# Patient Record
Sex: FEMALE | Race: WHITE | Employment: FULL TIME | URBAN - NONMETROPOLITAN AREA
[De-identification: names, ages, dates, MRNs, and addresses within clinical notes are randomized per-mention and may not be internally consistent; named-entity substitution may affect disease eponyms.]

---

## 2020-08-04 PROBLEM — F41.9 ANXIETY: Status: ACTIVE | Noted: 2020-08-04

## 2020-08-04 PROBLEM — E78.5 HYPERLIPIDEMIA: Status: ACTIVE | Noted: 2020-08-04

## 2020-08-04 PROBLEM — M25.579 ANKLE PAIN: Status: ACTIVE | Noted: 2020-08-04

## 2020-08-04 PROBLEM — J30.9 ALLERGIC RHINITIS: Status: ACTIVE | Noted: 2020-08-04

## 2020-08-06 ENCOUNTER — CLINICAL SUPPORT (OUTPATIENT)
Dept: FAMILY MEDICINE CLINIC | Age: 60
End: 2020-08-06
Payer: COMMERCIAL

## 2020-08-06 VITALS
SYSTOLIC BLOOD PRESSURE: 111 MMHG | TEMPERATURE: 98 F | BODY MASS INDEX: 27.85 KG/M2 | HEART RATE: 73 BPM | DIASTOLIC BLOOD PRESSURE: 73 MMHG | HEIGHT: 69 IN | WEIGHT: 188 LBS | OXYGEN SATURATION: 97 %

## 2020-08-06 DIAGNOSIS — J30.89 ALLERGIC RHINITIS DUE TO OTHER ALLERGIC TRIGGER, UNSPECIFIED SEASONALITY: Primary | ICD-10-CM

## 2020-08-06 DIAGNOSIS — Z51.6 DESENSITIZATION TO ALLERGENS: ICD-10-CM

## 2020-08-06 DIAGNOSIS — J30.2 SEASONAL ALLERGIC RHINITIS, UNSPECIFIED TRIGGER: ICD-10-CM

## 2020-08-06 PROCEDURE — 95117 IMMUNOTHERAPY INJECTIONS: CPT | Performed by: FAMILY MEDICINE

## 2020-08-07 NOTE — PROGRESS NOTES
Patient presented to office today for Allergy injection. Patient stated She did not have any adverse reaction to last injection on 08/06/20. Patient received 0.5ml of allergy IM to bilateral gluteal media (hip region). She tolerated procedure well. Patient was observed for 10 minutes, no adverse effects noted. She left ambulatory with no complaints of pain or distress noted. Lot Number : [id number]  Expires 09/6/20  Manufacture: serum mixed at Dr. Salome Chawla office  NDC:   Dosage:0.5mL  IM bilateral gluteal media      Imelda eYe LPN completed nurse visit, Naseem Verdin CMA is helping her document.

## 2020-09-03 ENCOUNTER — CLINICAL SUPPORT (OUTPATIENT)
Dept: FAMILY MEDICINE CLINIC | Age: 60
End: 2020-09-03

## 2020-09-03 VITALS
OXYGEN SATURATION: 97 % | HEART RATE: 75 BPM | DIASTOLIC BLOOD PRESSURE: 77 MMHG | SYSTOLIC BLOOD PRESSURE: 116 MMHG | HEIGHT: 69 IN | BODY MASS INDEX: 27.7 KG/M2 | TEMPERATURE: 97.2 F | WEIGHT: 187 LBS

## 2020-09-03 DIAGNOSIS — J30.1 ALLERGIC RHINITIS DUE TO POLLEN, UNSPECIFIED SEASONALITY: Primary | ICD-10-CM

## 2020-09-03 NOTE — PROGRESS NOTES
Patient presented to office today for allergy injection. Patient stated Chivo Shepard (caps):00762}  /DID OAE:20356} have any adverse reaction to last injection on 8/6/20. Patient received ml of allergy IM to left subcutaneous deltoid. She tolerated procedure well. Patient was observed for 10 minutes, no adverse effects noted. She left ambulatory with no complaints of pain or distress noted.        RX/Lot Number [id number]  Expires 9/6/20  Mixed Product Dori  Dosage:0.5  right Subcutaneous deltoid      RX/Lot Number [id number]  Expires 9/6/2020   Mixed Product by: Dr Blaine Pérez  Dosage: 0.5   Left Subcutaneous deltoid    Verbal Order from Dr. Albertina Sanford on 9/3/2020

## 2020-10-29 ENCOUNTER — CLINICAL SUPPORT (OUTPATIENT)
Dept: FAMILY MEDICINE CLINIC | Age: 60
End: 2020-10-29
Payer: COMMERCIAL

## 2020-10-29 VITALS
TEMPERATURE: 98.1 F | DIASTOLIC BLOOD PRESSURE: 84 MMHG | SYSTOLIC BLOOD PRESSURE: 117 MMHG | HEART RATE: 72 BPM | OXYGEN SATURATION: 98 %

## 2020-10-29 DIAGNOSIS — Z51.6 DESENSITIZATION TO ALLERGENS: ICD-10-CM

## 2020-10-29 DIAGNOSIS — J30.1 ALLERGIC RHINITIS DUE TO POLLEN, UNSPECIFIED SEASONALITY: Primary | ICD-10-CM

## 2020-10-29 DIAGNOSIS — J30.9 ALLERGIC RHINITIS, UNSPECIFIED SEASONALITY, UNSPECIFIED TRIGGER: ICD-10-CM

## 2020-10-29 PROCEDURE — 95117 IMMUNOTHERAPY INJECTIONS: CPT | Performed by: FAMILY MEDICINE

## 2020-10-29 NOTE — PROGRESS NOTES
Allergy Clinic Documentation        If Ole Hamilton had a reaction on the last injection, is pregnant, is on beta blockers or has an  vial, DO NOT GIVE THIS INJECTION. Call Marco Landaverde MD at [phone]    Last injection reaction:     LMP: No LMP recorded. Data Unavailable   Is patient on Beta Blockers? no  Has the vial ? no    If Ole Hamilton has a fever, feels ill or is having asthma symptoms, DO NOT GIVE THE INJECTION. Vitals:   Visit Vitals  /84 (BP 1 Location: Right arm, BP Patient Position: Sitting)   Pulse 72   Temp 98.1 °F (36.7 °C)   SpO2 98%      Asthma symptoms? no  Feels ill? no    As per orders of [facility] NURSE 1, an injection of allergy serum was given. She was asked to report any reaction prior to leaving the clinic. Dav Harrell LPN     Previous Allergy Injection  No flowsheet data found.    Allergy Flowsheet

## 2020-10-29 NOTE — PROGRESS NOTES
Patient presented to office today for allergy injection. Patient stated She  did not have any adverse reaction to last injection. Patient received 0.3ml of allergy IM to bilateral  In her hips. She tolerated procedure well. Patient was observed for 10 minutes, no adverse effects noted. She left ambulatory with no complaints of pain or distress noted. Patient received injections per verbal order from Dr. Jannette Aparicio.

## 2020-11-05 ENCOUNTER — CLINICAL SUPPORT (OUTPATIENT)
Dept: FAMILY MEDICINE CLINIC | Age: 60
End: 2020-11-05
Payer: COMMERCIAL

## 2020-11-05 VITALS
HEART RATE: 74 BPM | HEIGHT: 69 IN | DIASTOLIC BLOOD PRESSURE: 77 MMHG | SYSTOLIC BLOOD PRESSURE: 116 MMHG | WEIGHT: 190 LBS | BODY MASS INDEX: 28.14 KG/M2

## 2020-11-05 DIAGNOSIS — J30.9 ALLERGIC RHINITIS, UNSPECIFIED SEASONALITY, UNSPECIFIED TRIGGER: ICD-10-CM

## 2020-11-05 DIAGNOSIS — Z51.6 DESENSITIZATION TO ALLERGENS: ICD-10-CM

## 2020-11-05 PROCEDURE — 95117 IMMUNOTHERAPY INJECTIONS: CPT | Performed by: FAMILY MEDICINE

## 2020-11-05 NOTE — PROGRESS NOTES
Allergy Clinic Documentation        If Jumana Lennon had a reaction on the last injection, is pregnant, is on beta blockers or has an  vial, DO NOT GIVE THIS INJECTION. Call Diamond Cruz MD at [phone]    Last injection reaction:     LMP: No LMP recorded. Data Unavailable   Is patient on Beta Blockers? no  Has the vial ? no    If Jumana Lennon has a fever, feels ill or is having asthma symptoms, DO NOT GIVE THE INJECTION. Vitals:   Visit Vitals  /77 (BP 1 Location: Right arm, BP Patient Position: Sitting)   Pulse 74   Ht 5' 9\" (1.753 m)   Wt 190 lb (86.2 kg)   BMI 28.06 kg/m²      Asthma symptoms? no  Feels ill? no    As per orders of [facility] NURSE 1, an injection of allergy serum was given bilateral in the hips. She was asked to report any reaction prior to leaving the clinic. Breonna Nash LPN     Previous Allergy Injection  No flowsheet data found.    Allergy Flowsheet

## 2020-11-12 ENCOUNTER — CLINICAL SUPPORT (OUTPATIENT)
Dept: FAMILY MEDICINE CLINIC | Age: 60
End: 2020-11-12
Payer: COMMERCIAL

## 2020-11-12 VITALS — DIASTOLIC BLOOD PRESSURE: 82 MMHG | TEMPERATURE: 97.5 F | HEART RATE: 83 BPM | SYSTOLIC BLOOD PRESSURE: 121 MMHG

## 2020-11-12 DIAGNOSIS — Z51.6 DESENSITIZATION TO ALLERGENS: ICD-10-CM

## 2020-11-12 DIAGNOSIS — J30.9 ALLERGIC RHINITIS, UNSPECIFIED SEASONALITY, UNSPECIFIED TRIGGER: ICD-10-CM

## 2020-11-12 PROCEDURE — 95117 IMMUNOTHERAPY INJECTIONS: CPT | Performed by: FAMILY MEDICINE

## 2020-11-12 NOTE — PROGRESS NOTES
Allergy Clinic Documentation        If Rah Rajput had a reaction on the last injection, is pregnant, is on beta blockers or has an  vial, DO NOT GIVE THIS INJECTION. Call Sabine Garrido MD at [phone]    Last injection reaction:     LMP: No LMP recorded. Data Unavailable   Is patient on Beta Blockers? no  Has the vial ? no    If Rah Rajput has a fever, feels ill or is having asthma symptoms, DO NOT GIVE THE INJECTION. Vitals:   Visit Vitals  /82 (BP 1 Location: Left arm, BP Patient Position: Sitting)   Pulse 83   Temp 97.5 °F (36.4 °C)      Asthma symptoms? no  Feels ill? no    As per orders of [facility] NURSE 1, an injection of allergy serum was given in each hip . She was asked to report any reaction prior to leaving the clinic. Eren Wade LPN     Previous Allergy Injection  No flowsheet data found.    Allergy Flowsheet

## 2020-12-22 ENCOUNTER — CLINICAL SUPPORT (OUTPATIENT)
Dept: FAMILY MEDICINE CLINIC | Age: 60
End: 2020-12-22

## 2020-12-22 VITALS — TEMPERATURE: 97.8 F | HEART RATE: 93 BPM | SYSTOLIC BLOOD PRESSURE: 120 MMHG | DIASTOLIC BLOOD PRESSURE: 77 MMHG

## 2020-12-22 DIAGNOSIS — J30.9 ALLERGIC RHINITIS, UNSPECIFIED SEASONALITY, UNSPECIFIED TRIGGER: ICD-10-CM

## 2020-12-22 NOTE — PROGRESS NOTES
Allergy Clinic Documentation        If Dada Buchanan had a reaction on the last injection, is pregnant, is on beta blockers or has an  vial, DO NOT GIVE THIS INJECTION. Call Nick Gomes MD at [phone]    Last injection reaction:     LMP: No LMP recorded. Data Unavailable   Is patient on Beta Blockers? no  Has the vial ? no    If Dada Buchanan has a fever, feels ill or is having asthma symptoms, DO NOT GIVE THE INJECTION. Vitals:   Visit Vitals  /77 (BP 1 Location: Left arm, BP Patient Position: Sitting)   Pulse 93   Temp 97.8 °F (36.6 °C)      Asthma symptoms? no  Feels ill? no    As per orders of [facility] NURSE 1, an injection of allergy serum was given bilateral in hips. She was asked to report any reaction prior to leaving the clinic. Gemini Sanchez LPN     Previous Allergy Injection  No flowsheet data found.    Allergy Flowsheet

## 2021-01-12 ENCOUNTER — CLINICAL SUPPORT (OUTPATIENT)
Dept: FAMILY MEDICINE CLINIC | Age: 61
End: 2021-01-12
Payer: COMMERCIAL

## 2021-01-12 DIAGNOSIS — Z51.6 DESENSITIZATION TO ALLERGENS: Primary | ICD-10-CM

## 2021-01-12 PROCEDURE — 95117 IMMUNOTHERAPY INJECTIONS: CPT | Performed by: FAMILY MEDICINE

## 2021-02-09 ENCOUNTER — VIRTUAL VISIT (OUTPATIENT)
Dept: FAMILY MEDICINE CLINIC | Age: 61
End: 2021-02-09
Payer: COMMERCIAL

## 2021-02-09 DIAGNOSIS — Z13.21 ENCOUNTER FOR VITAMIN DEFICIENCY SCREENING: ICD-10-CM

## 2021-02-09 DIAGNOSIS — Z13.29 SCREENING FOR THYROID DISORDER: ICD-10-CM

## 2021-02-09 DIAGNOSIS — Z11.59 NEED FOR HEPATITIS C SCREENING TEST: ICD-10-CM

## 2021-02-09 DIAGNOSIS — E78.2 MIXED HYPERLIPIDEMIA: ICD-10-CM

## 2021-02-09 DIAGNOSIS — J30.1 ALLERGIC RHINITIS DUE TO POLLEN, UNSPECIFIED SEASONALITY: ICD-10-CM

## 2021-02-09 DIAGNOSIS — E78.2 MIXED HYPERLIPIDEMIA: Primary | ICD-10-CM

## 2021-02-09 PROCEDURE — 99443 PR PHYS/QHP TELEPHONE EVALUATION 21-30 MIN: CPT | Performed by: NURSE PRACTITIONER

## 2021-02-09 RX ORDER — MONTELUKAST SODIUM 10 MG/1
10 TABLET ORAL DAILY
COMMUNITY

## 2021-02-09 RX ORDER — FLUTICASONE PROPIONATE 50 MCG
1 SPRAY, SUSPENSION (ML) NASAL
COMMUNITY

## 2021-02-09 RX ORDER — ATORVASTATIN CALCIUM 20 MG/1
20 TABLET, FILM COATED ORAL
COMMUNITY
End: 2021-03-24

## 2021-02-09 RX ORDER — PAROXETINE 7.5 MG/1
1 CAPSULE ORAL DAILY
COMMUNITY

## 2021-02-09 RX ORDER — LORATADINE 10 MG/1
10 TABLET ORAL
COMMUNITY

## 2021-02-09 NOTE — PROGRESS NOTES
Pursuant to the emergency declaration under the [facility] waiver authority and the [employer] Act, this phone visit was conducted, with patient's consent, to reduce the patient's risk of exposure to COVID-19 and provide continuity of care for an established patient. She and/or health care decision maker is aware that that she may receive a bill for this telephone service, depending on her insurance coverage, and has provided verbal consent to proceed. This is a Patient Initiated Episode with an Established Patient who has not had a related appointment within my department in the past 7 days or scheduled within the next 24 hours. HISTORY OF PRESENTING ILLNESS      James Hernandez is a 62 y.o. female evaluated via telephone on 2/9/2021 due to COVID 19 restrictions. Patient unable to participate in Virtual Visit with synchronous audio/visual technology. Patient presents to establish care. Patient has medical history significant for allergic rhinitis well-controlled on current medication regimen mixed hyperlipidemia tolerating atorvastatin without difficulty as well as post menopausal flushing and hot flashes which she states is well controlled on low-dose Paxil. Patient verbalizes no complaints of today. PCP Provider  Jose G Castañeda NP  Past Medical History:   Diagnosis Date    Allergic rhinitis 8/4/2020    Ankle pain 8/4/2020    Anxiety 8/4/2020    Asthma     Hyperlipidemia 8/4/2020      Past Surgical History:   Procedure Laterality Date    HX ORTHOPAEDIC      reconstructive jaw    HX TONSILLECTOMY       Allergies   Allergen Reactions    Latex Rash    Penicillins Anaphylaxis      History reviewed. No pertinent family history. Current Outpatient Medications   Medication Sig    atorvastatin (LIPITOR) 20 mg tablet Take 20 mg by mouth nightly.     PARoxetine mesylate,menop.sym, (Brisdelle) 7.5 mg cap Take 1 Tab by mouth daily.  montelukast (SINGULAIR) 10 mg tablet Take 10 mg by mouth daily.  loratadine (CLARITIN) 10 mg tablet Take 10 mg by mouth.  fluticasone propionate (Flonase Allergy Relief) 50 mcg/actuation nasal spray 1 [place] by Both Nostrils route nightly.  allergy injection Injected 0.5 ml subcutaneously in left arm    allergy injection Injected 0.5 ml right arm subcutaneously     No current facility-administered medications for this visit. There were no vitals filed for this visit.   Social History     Socioeconomic History    Marital status:      Spouse name: Not on file    Number of children: Not on file    Years of education: Not on file    Highest education level: Not on file   Occupational History    Not on file   Social Needs    Financial resource strain: Not on file    Food insecurity     Worry: Not on file     Inability: Not on file    Transportation needs     Medical: Not on file     Non-medical: Not on file   Tobacco Use    Smoking status: Never Smoker    Smokeless tobacco: Never Used    Tobacco comment: raised by smoker and was around smokers in the [employer], exposed to second had smoke until 23years old   Substance and Sexual Activity    Alcohol use: Not Currently    Drug use: Never    Sexual activity: Not on file   Lifestyle    Physical activity     Days per week: Not on file     Minutes per session: Not on file    Stress: Not on file   Relationships    Social connections     Talks on phone: Not on file     Gets together: Not on file     Attends Christian service: Not on file     Active member of club or organization: Not on file     Attends meetings of clubs or organizations: Not on file     Relationship status: Not on file    Intimate partner violence     Fear of current or ex partner: Not on file     Emotionally abused: Not on file     Physically abused: Not on file     Forced sexual activity: Not on file   Other Topics Concern    Not on file   Social History Narrative    Not on file       MEDICATIONS     Current Outpatient Medications   Medication Sig    atorvastatin (LIPITOR) 20 mg tablet Take 20 mg by mouth nightly.  PARoxetine mesylate,menop.sym, (Brisdelle) 7.5 mg cap Take 1 Tab by mouth daily.  montelukast (SINGULAIR) 10 mg tablet Take 10 mg by mouth daily.  loratadine (CLARITIN) 10 mg tablet Take 10 mg by mouth.  fluticasone propionate (Flonase Allergy Relief) 50 mcg/actuation nasal spray 1 [place] by Both Nostrils route nightly.  allergy injection Injected 0.5 ml subcutaneously in left arm    allergy injection Injected 0.5 ml right arm subcutaneously     No current facility-administered medications for this visit. I have reviewed the nurses notes, vitals, problem list, allergy list, medical history, family, social history and medications. REVIEW OF SYMPTOMS     General: Pt denies excessive weight gain or loss. Pt is able to conduct ADL's  HEENT: Denies blurred vision, headaches, hearing loss, epistaxis and difficulty swallowing. Respiratory: Denies cough, congestion, shortness of breath, BRANCH, wheezing or stridor. Cardiovascular: Denies precordial pain, palpitations, edema or PND  Gastrointestinal: Denies poor appetite, indigestion, abdominal pain or blood in stool  Genitourinary: Denies hematuria, dysuria, increased urinary frequency  Musculoskeletal: Denies joint pain or swelling from muscles or joints  Neurologic: Denies tremor, paresthesias, headache, or sensory motor disturbance  Psychiatric: Denies confusion, insomnia, depression  Integumentray: Denies rash, itching or ulcers.   Hematologic: Denies easy bruising, bleeding       PHYSICAL EXAM       Due to this being a telephone encounter a very limited exam was performed  Neurological: A&Ox3, no slurred speech, answering questions appropriately  Respiratory: Non labored, talking in complete sentences, no audible wheeze over the phone        ASSESSMENT        Diagnoses and all orders for this visit:    1. Mixed hyperlipidemia  -     CBC W/O DIFF; Future  -     LIPID PANEL; Future  -     METABOLIC PANEL, COMPREHENSIVE; Future  -Labs today any changes to current treatment contingent upon those findings    2. Allergic rhinitis due to pollen, unspecified seasonality  The current medical regimen is effective;  continue present plan and medications. 3. Encounter for vitamin deficiency screening  -     CBC W/O DIFF; Future  -     VITAMIN B12; Future  -     VITAMIN D, 25 HYDROXY; Future  -Labs today any changes to current treatment contingent upon those findings    4. Need for hepatitis C screening test  -     HEPATITIS C AB; Future  -Labs today any changes to current treatment contingent upon those findings    5. Screening for thyroid disorder  -     TSH 3RD GENERATION; Future  -Labs today any changes to current treatment contingent upon those findings      ICD-10-CM ICD-9-CM    1. Mixed hyperlipidemia  E78.2 272.2 CBC W/O DIFF      LIPID PANEL      METABOLIC PANEL, COMPREHENSIVE   2. Allergic rhinitis due to pollen, unspecified seasonality  J30.1 477.0    3. Encounter for vitamin deficiency screening  Z13.21 V77.99 CBC W/O DIFF      VITAMIN B12      VITAMIN D, 25 HYDROXY   4. Need for hepatitis C screening test  Z11.59 V73.89 HEPATITIS C AB   5.  Screening for thyroid disorder  Z13.29 V77.0 TSH 3RD GENERATION     Orders Placed This Encounter    CBC W/O DIFF     Standing Status:   Future     Standing Expiration Date:   2/10/2022    LIPID PANEL     Standing Status:   Future     Standing Expiration Date:   3/0/2673    METABOLIC PANEL, COMPREHENSIVE     Standing Status:   Future     Standing Expiration Date:   2/10/2022    VITAMIN B12     Standing Status:   Future     Standing Expiration Date:   2/10/2022    VITAMIN D, 25 HYDROXY     Standing Status:   Future     Standing Expiration Date:   2/10/2022    TSH 3RD GENERATION Standing Status:   Future     Standing Expiration Date:   2/10/2022    HEPATITIS C AB     Standing Status:   Future     Standing Expiration Date:   2/10/2022    atorvastatin (LIPITOR) 20 mg tablet     Sig: Take 20 mg by mouth nightly.  PARoxetine mesylate,menop.sym, (Brisdelle) 7.5 mg cap     Sig: Take 1 Tab by mouth daily.  montelukast (SINGULAIR) 10 mg tablet     Sig: Take 10 mg by mouth daily.  loratadine (CLARITIN) 10 mg tablet     Sig: Take 10 mg by mouth.  fluticasone propionate (Flonase Allergy Relief) 50 mcg/actuation nasal spray     [place] by Both Nostrils route nightly. PLAN       TIME 23 (Minutes) SPENT RELATED TO THIS PHONE ENCOUNTER    We discussed the expected course, resolution and complications of the diagnosis(es) in detail. Medication risks, benefits, costs, interactions, and alternatives were discussed as indicated. I advised her to contact the office if her condition worsens, changes or fails to improve as anticipated.  She expressed understanding with the diagnosis(es) and plan

## 2021-02-11 LAB
LDL-C, EXTERNAL: 104
TOTAL CHOLESTEROL, NCHOLT: 195

## 2021-03-24 DIAGNOSIS — E78.49 OTHER HYPERLIPIDEMIA: Primary | ICD-10-CM

## 2021-03-24 RX ORDER — ATORVASTATIN CALCIUM 20 MG/1
TABLET, FILM COATED ORAL
Qty: 90 TAB | Refills: 0 | Status: SHIPPED | OUTPATIENT
Start: 2021-03-24 | End: 2021-06-20

## 2021-06-20 DIAGNOSIS — E78.49 OTHER HYPERLIPIDEMIA: ICD-10-CM

## 2021-06-20 RX ORDER — ATORVASTATIN CALCIUM 20 MG/1
TABLET, FILM COATED ORAL
Qty: 90 TABLET | Refills: 0 | Status: SHIPPED | OUTPATIENT
Start: 2021-06-20 | End: 2021-09-08

## 2021-08-31 ENCOUNTER — OFFICE VISIT (OUTPATIENT)
Dept: FAMILY MEDICINE CLINIC | Age: 61
End: 2021-08-31
Payer: COMMERCIAL

## 2021-08-31 VITALS
RESPIRATION RATE: 19 BRPM | TEMPERATURE: 97.4 F | HEART RATE: 72 BPM | HEIGHT: 69 IN | OXYGEN SATURATION: 98 % | SYSTOLIC BLOOD PRESSURE: 116 MMHG | DIASTOLIC BLOOD PRESSURE: 70 MMHG | BODY MASS INDEX: 26.81 KG/M2 | WEIGHT: 181 LBS

## 2021-08-31 DIAGNOSIS — Z00.00 WELLNESS EXAMINATION: ICD-10-CM

## 2021-08-31 DIAGNOSIS — J30.2 SEASONAL ALLERGIC RHINITIS, UNSPECIFIED TRIGGER: ICD-10-CM

## 2021-08-31 DIAGNOSIS — E78.2 MIXED HYPERLIPIDEMIA: Primary | ICD-10-CM

## 2021-08-31 PROCEDURE — 99396 PREV VISIT EST AGE 40-64: CPT | Performed by: NURSE PRACTITIONER

## 2021-08-31 NOTE — PROGRESS NOTES
Yuridia Ferris presents today for   Chief Complaint   Patient presents with    Follow-up       Is someone accompanying this pt? No    Is the patient using any DME equipment during OV? No    Depression Screening:  3 most recent PHQ Screens 8/31/2021   Little interest or pleasure in doing things Not at all   Feeling down, depressed, irritable, or hopeless Not at all   Total Score PHQ 2 0       Learning Assessment:  Learning Assessment 2/9/2021   PRIMARY LEARNER Patient   HIGHEST LEVEL OF EDUCATION - PRIMARY LEARNER  > 4 YEARS OF COLLEGE   BARRIERS PRIMARY LEARNER NONE   CO-LEARNER CAREGIVER No   PRIMARY LANGUAGE ENGLISH   LEARNER PREFERENCE PRIMARY LISTENING   ANSWERED BY patient   RELATIONSHIP SELF       Health Maintenance reviewed and discussed and ordered per Provider. Health Maintenance Due   Topic Date Due    Shingrix Vaccine Age 49> (1 of 2) Never done    Breast Cancer Screen Mammogram  11/22/2019   . Coordination of Care:  1. Have you been to the ER, urgent care clinic since your last visit? Hospitalized since your last visit? No    2. Have you seen or consulted any other health care providers outside of the [address] [place] [address] since your last visit? Include any pap smears or colon screening.  no

## 2021-09-06 NOTE — PROGRESS NOTES
Ramya Canas is a 62 y. o.female presents with   Chief Complaint   Patient presents with    Follow-up       71-year-old female presents today in office for annual adult preventive exam.  Patient has medical history significant for mixed hyperlipidemia tolerating atorvastatin without difficulty as well as seasonal allergies currently on allergy shots. She verbalizes no complaints of today. Subjective:           Past Medical History:   Diagnosis Date    Allergic rhinitis 8/4/2020    Ankle pain 8/4/2020    Anxiety 8/4/2020    Asthma     Hyperlipidemia 8/4/2020     Past Surgical History:   Procedure Laterality Date    HX ORTHOPAEDIC      reconstructive jaw    HX TONSILLECTOMY       Social History     Socioeconomic History    Marital status:      Spouse name: Not on file    Number of children: Not on file    Years of education: Not on file    Highest education level: Not on file   Tobacco Use    Smoking status: Never Smoker    Smokeless tobacco: Never Used    Tobacco comment: raised by smoker and was around smokers in the [employer], exposed to second had smoke until 23years old   Vaping Use    Vaping Use: Never used   Substance and Sexual Activity    Alcohol use: Not Currently    Drug use: Never     Social Determinants of Health     Financial Resource Strain:     Difficulty of Paying Living Expenses:    Food Insecurity:     Worried About Running Out of Food in the Last Year:     [address] in the Last Year:    Transportation Needs:     Lack of Transportation (Medical):      Lack of Transportation (Non-Medical):    Physical Activity:     Days of Exercise per Week:     Minutes of Exercise per Session:    Stress:     Feeling of Stress :    Social Connections:     Frequency of Communication with Friends and Family:     Frequency of Social Gatherings with Friends and Family:     Attends Synagogue Services:     Active Member of Clubs or Organizations:     Attends Club or Organization Meetings:     Marital Status:      Current Outpatient Medications   Medication Sig Dispense Refill    atorvastatin (LIPITOR) 20 mg tablet Take 1 tablet by mouth once daily 90 Tablet 0    PARoxetine mesylate,menop.sym, (Brisdelle) 7.5 mg cap Take 1 Tab by mouth daily.  montelukast (SINGULAIR) 10 mg tablet Take 10 mg by mouth daily.  loratadine (CLARITIN) 10 mg tablet Take 10 mg by mouth.  fluticasone propionate (Flonase Allergy Relief) 50 mcg/actuation nasal spray 1 [place] by Both Nostrils route nightly.  allergy injection Injected 0.5 ml subcutaneously in left arm 0.5 mL 0    allergy injection Injected 0.5 ml right arm subcutaneously 0.5 mL 0     Allergies   Allergen Reactions    Latex Rash    Penicillins Anaphylaxis     The patient has a family history of    REVIEW OF SYSTEMS  Review of Systems   Constitutional: Negative for chills and fever. HENT: Positive for congestion. Negative for ear discharge, ear pain, hearing loss, sinus pain and sore throat. Eyes: Negative for pain. Respiratory: Negative for cough and shortness of breath. Cardiovascular: Negative for chest pain, palpitations and leg swelling. Gastrointestinal: Negative for abdominal pain, nausea and vomiting. Genitourinary: Negative for dysuria, frequency and urgency. Musculoskeletal: Negative for falls, myalgias and neck pain. Skin: Negative for rash. Neurological: Negative for dizziness, tingling, tremors and headaches. Psychiatric/Behavioral: Negative for depression, substance abuse and suicidal ideas. The patient is not nervous/anxious and does not have insomnia.         Objective:     Visit Vitals  /70 (BP 1 Location: Left upper arm, BP Patient Position: Sitting, BP Cuff Size: Adult)   Pulse 72   Temp 97.4 °F (36.3 °C) (Temporal)   Resp 19   Ht 5' 9\" (1.753 m)   Wt 181 lb (82.1 kg)   SpO2 98%   BMI 26.73 kg/m²       Current Outpatient Medications   Medication Instructions    allergy injection Injected 0.5 ml subcutaneously in left arm    allergy injection Injected 0.5 ml right arm subcutaneously    atorvastatin (LIPITOR) 20 mg tablet Take 1 tablet by mouth once daily    fluticasone propionate (Flonase Allergy Relief) 50 mcg/actuation nasal spray 1 Spray, Both Nostrils, EVERY BEDTIME    loratadine (CLARITIN) 10 mg, Oral    montelukast (SINGULAIR) 10 mg, Oral, DAILY    PARoxetine mesylate,menop.sym, (Brisdelle) 7.5 mg cap 1 Tablet, Oral, DAILY        PHYSICAL EXAM  Physical Exam  Constitutional:       General: She is not in acute distress. Appearance: Normal appearance. She is not ill-appearing. HENT:      Head: Normocephalic. Right Ear: External ear normal.      Left Ear: External ear normal.   Eyes:      General: No scleral icterus. Right eye: No discharge. Left eye: No discharge. Cardiovascular:      Rate and Rhythm: Normal rate and regular rhythm. Pulses: Normal pulses. Heart sounds: Normal heart sounds. Pulmonary:      Effort: Pulmonary effort is normal.      Breath sounds: Normal breath sounds. Musculoskeletal:      Cervical back: No muscular tenderness. Right lower leg: No edema. Left lower leg: No edema. Lymphadenopathy:      Cervical: No cervical adenopathy. Skin:     General: Skin is warm and dry. Neurological:      Mental Status: She is alert and oriented to person, place, and time. Psychiatric:         Mood and Affect: Mood normal.         Behavior: Behavior normal.         Thought Content: Thought content normal.         Judgment: Judgment normal.         Assessment/Plan:     Diagnoses and all orders for this visit:    1. Mixed hyperlipidemia    2. Wellness examination  -     CBC W/O DIFF  -     LIPID PANEL  -     METABOLIC PANEL, COMPREHENSIVE  -     VITAMIN B12  -     VITAMIN D, 25 HYDROXY  -     TSH 3RD GENERATION    3.  Seasonal allergic rhinitis, unspecified trigger    -Labs today any changes to current treatment contingent upon those findings      Follow-up and Dispositions    · Return in about 1 year (around 8/31/2022). Disclaimer:    I have discussed the diagnosis with the patient and the intended plan as seen above. The patient understands our medical plan. The risks, benefits and significant side effects of all medications have been reviewed. Anticipated time course and progression of condition reviewed. All questions have been addressed. She received an after visit summary, with information reviewed, and questions answered. Where appropriate, she is instructed to call the clinic if she has not been notified either by phone or through [id number] with the results of her tests or with an appointment plan for any referrals within 1 week(s). The patient  is to call if her condition worsens or fails to improve or if significant side effects are experienced.        Maria Eugenia Hunter, NP

## 2021-09-08 DIAGNOSIS — E78.49 OTHER HYPERLIPIDEMIA: ICD-10-CM

## 2021-09-08 RX ORDER — ATORVASTATIN CALCIUM 20 MG/1
TABLET, FILM COATED ORAL
Qty: 90 TABLET | Refills: 0 | Status: SHIPPED | OUTPATIENT
Start: 2021-09-08 | End: 2021-12-15 | Stop reason: SDUPTHER

## 2021-12-09 ENCOUNTER — IMMUNIZATION (OUTPATIENT)
Dept: FAMILY MEDICINE CLINIC | Age: 61
End: 2021-12-09

## 2021-12-15 DIAGNOSIS — E78.49 OTHER HYPERLIPIDEMIA: ICD-10-CM

## 2021-12-15 RX ORDER — ATORVASTATIN CALCIUM 20 MG/1
20 TABLET, FILM COATED ORAL DAILY
Qty: 90 TABLET | Refills: 1 | Status: SHIPPED | OUTPATIENT
Start: 2021-12-15 | End: 2022-06-21

## 2021-12-15 NOTE — TELEPHONE ENCOUNTER
Pt needs refill of Atorvastatin. [address]. She said that she has 3 weeks left, but wanted to get it situated before she's out. It still has Dr. Conchita Patton name on it and they wouldn't process it.

## 2022-03-18 PROBLEM — E78.5 HYPERLIPIDEMIA: Status: ACTIVE | Noted: 2020-08-04

## 2022-03-18 PROBLEM — J30.9 ALLERGIC RHINITIS: Status: ACTIVE | Noted: 2020-08-04

## 2022-03-18 PROBLEM — M25.579 ANKLE PAIN: Status: ACTIVE | Noted: 2020-08-04

## 2022-03-19 PROBLEM — F41.9 ANXIETY: Status: ACTIVE | Noted: 2020-08-04

## 2022-09-08 ENCOUNTER — OFFICE VISIT (OUTPATIENT)
Dept: FAMILY MEDICINE CLINIC | Age: 62
End: 2022-09-08
Payer: COMMERCIAL

## 2022-09-08 VITALS
HEART RATE: 72 BPM | WEIGHT: 189 LBS | TEMPERATURE: 97.7 F | RESPIRATION RATE: 19 BRPM | OXYGEN SATURATION: 98 % | SYSTOLIC BLOOD PRESSURE: 132 MMHG | HEIGHT: 69 IN | DIASTOLIC BLOOD PRESSURE: 86 MMHG | BODY MASS INDEX: 27.99 KG/M2

## 2022-09-08 DIAGNOSIS — E78.2 MIXED HYPERLIPIDEMIA: Primary | ICD-10-CM

## 2022-09-08 DIAGNOSIS — M25.40 SWOLLEN JOINT: ICD-10-CM

## 2022-09-08 DIAGNOSIS — Z00.00 WELLNESS EXAMINATION: ICD-10-CM

## 2022-09-08 DIAGNOSIS — M25.542 ARTHRALGIA OF BOTH HANDS: ICD-10-CM

## 2022-09-08 DIAGNOSIS — M25.541 ARTHRALGIA OF BOTH HANDS: ICD-10-CM

## 2022-09-08 PROCEDURE — 99213 OFFICE O/P EST LOW 20 MIN: CPT | Performed by: NURSE PRACTITIONER

## 2022-09-08 PROCEDURE — 99396 PREV VISIT EST AGE 40-64: CPT | Performed by: NURSE PRACTITIONER

## 2022-09-08 NOTE — PROGRESS NOTES
Kaela Breaux presents today for   Chief Complaint   Patient presents with    Physical       Is someone accompanying this pt? No    Is the patient using any DME equipment during OV? No    Depression Screening:  3 most recent PHQ Screens 9/8/2022   Little interest or pleasure in doing things Not at all   Feeling down, depressed, irritable, or hopeless Not at all   Total Score PHQ 2 0       Learning Assessment:  Learning Assessment 2/9/2021   PRIMARY LEARNER Patient   HIGHEST LEVEL OF EDUCATION - PRIMARY LEARNER  > 4 YEARS OF COLLEGE   BARRIERS PRIMARY LEARNER NONE   CO-LEARNER CAREGIVER No   PRIMARY LANGUAGE ENGLISH   LEARNER PREFERENCE PRIMARY LISTENING   ANSWERED BY patient   RELATIONSHIP SELF       Fall Risk  No flowsheet data found. Health Maintenance reviewed and discussed and ordered per Provider. Health Maintenance Due   Topic Date Due    Shingrix Vaccine Age 49> (1 of 2) Never done    Breast Cancer Screen Mammogram  11/22/2019    Lipid Screen  02/11/2022    Cervical cancer screen  07/11/2022    Flu Vaccine (1) 09/01/2022    Depression Screen  08/31/2022   . Coordination of Care:    1. Have you been to the ER, urgent care clinic since your last visit? Hospitalized since your last visit? No    2. Have you seen or consulted any other health care providers outside of the [address] [place] [address] since your last visit? Include any pap smears or colon screening. No    3. For patients aged 39-70: Has the patient had a colonoscopy / FIT/ Cologuard? Yes - no Care Gap present      If the patient is female:    4. For patients aged 41-77: Has the patient had a mammogram within the past 2 years? No - patient refused      5. For patients aged 21-65: Has the patient had a pap smear? Yes - Care Gap present.  Rooming MA/LPN to request most recent results - Dr. Emely Sanchez

## 2022-09-13 ENCOUNTER — HOSPITAL ENCOUNTER (OUTPATIENT)
Dept: LAB | Age: 62
Discharge: HOME | End: 2022-09-13
Payer: COMMERCIAL

## 2022-09-13 LAB
ALBUMIN SERPL-MCNC: 3.7 G/DL (ref 3.4–5)
ALBUMIN/GLOB SERPL: 1.1 {RATIO} (ref 0.8–1.7)
ALP SERPL-CCNC: 109 U/L (ref 45–117)
ALT SERPL-CCNC: 35 U/L (ref 13–56)
ANION GAP SERPL CALC-SCNC: 11 MMOL/L (ref 3–18)
AST SERPL W P-5'-P-CCNC: 18 U/L (ref 10–38)
BASOPHILS # BLD: 0.1 K/UL (ref 0–0.1)
BASOPHILS NFR BLD: 1 % (ref 0–2)
BILIRUB SERPL-MCNC: 0.9 MG/DL (ref 0.2–1)
BUN SERPL-MCNC: 19 MG/DL (ref 7–18)
BUN/CREAT SERPL: 24 (ref 12–20)
CA-I BLD-MCNC: 9.5 MG/DL (ref 8.5–10.1)
CHLORIDE SERPL-SCNC: 102 MMOL/L (ref 100–111)
CO2 SERPL-SCNC: 25 MMOL/L (ref 21–32)
CREAT SERPL-MCNC: 0.78 MG/DL (ref 0.6–1.3)
DIFFERENTIAL METHOD BLD: ABNORMAL
EOSINOPHIL # BLD: 0.1 K/UL (ref 0–0.4)
EOSINOPHIL NFR BLD: 1 % (ref 0–5)
ERYTHROCYTE [DISTWIDTH] IN BLOOD BY AUTOMATED COUNT: 13.2 % (ref 11.6–14.5)
GLOBULIN SER CALC-MCNC: 3.4 G/DL (ref 2–4)
GLUCOSE SERPL-MCNC: 120 MG/DL (ref 74–99)
HCT VFR BLD AUTO: 38.4 % (ref 35–45)
HGB BLD-MCNC: 12.3 G/DL (ref 12–16)
IMM GRANULOCYTES # BLD AUTO: 0 K/UL (ref 0–0.04)
IMM GRANULOCYTES NFR BLD AUTO: 0 % (ref 0–0.5)
LYMPHOCYTES # BLD: 2.5 K/UL (ref 0.9–3.6)
LYMPHOCYTES NFR BLD: 35 % (ref 21–52)
MCH RBC QN AUTO: 30.1 PG (ref 24–34)
MCHC RBC AUTO-ENTMCNC: 32 G/DL (ref 31–37)
MCV RBC AUTO: 94.1 FL (ref 78–100)
MONOCYTES # BLD: 0.6 K/UL (ref 0.05–1.2)
MONOCYTES NFR BLD: 8 % (ref 3–10)
NEUTS SEG # BLD: 4.1 K/UL (ref 1.8–8)
NEUTS SEG NFR BLD: 55 % (ref 40–73)
NRBC # BLD: 0 K/UL (ref 0–0.01)
NRBC BLD-RTO: 0 PER 100 WBC
PLATELET # BLD AUTO: 300 K/UL (ref 135–420)
PMV BLD AUTO: 9.4 FL (ref 9.2–11.8)
POTASSIUM SERPL-SCNC: 3.7 MMOL/L (ref 3.5–5.5)
PROT SERPL-MCNC: 7.1 G/DL (ref 6.4–8.2)
RBC # BLD AUTO: 4.08 M/UL (ref 4.2–5.3)
SODIUM SERPL-SCNC: 138 MMOL/L (ref 136–145)
TSH SERPL DL<=0.05 MIU/L-ACNC: 2.31 UIU/ML (ref 0.36–3.74)
WBC # BLD AUTO: 7.3 K/UL (ref 4.6–13.2)

## 2022-09-13 PROCEDURE — 82306 VITAMIN D 25 HYDROXY: CPT

## 2022-09-13 PROCEDURE — 36415 COLL VENOUS BLD VENIPUNCTURE: CPT

## 2022-09-13 PROCEDURE — 80053 COMPREHEN METABOLIC PANEL: CPT

## 2022-09-13 PROCEDURE — 86431 RHEUMATOID FACTOR QUANT: CPT

## 2022-09-13 PROCEDURE — 82607 VITAMIN B-12: CPT

## 2022-09-13 PROCEDURE — 86038 ANTINUCLEAR ANTIBODIES: CPT

## 2022-09-13 PROCEDURE — 80061 LIPID PANEL: CPT

## 2022-09-13 PROCEDURE — 85025 COMPLETE CBC W/AUTO DIFF WBC: CPT

## 2022-09-13 PROCEDURE — 84443 ASSAY THYROID STIM HORMONE: CPT

## 2022-09-14 LAB
25(OH)D3 SERPL-MCNC: 60.2 NG/ML (ref 30–100)
ANA SER QL: NEGATIVE
CHOLEST SERPL-MCNC: 155 MG/DL
HDLC SERPL-MCNC: 67 MG/DL (ref 40–60)
HDLC SERPL: 2.3 {RATIO} (ref 0–5)
LDLC SERPL CALC-MCNC: 72.4 MG/DL (ref 0–100)
LIPID PROFILE,FLP: ABNORMAL
RHEUMATOID FACT SERPL-ACNC: <10 IU/ML
TRIGL SERPL-MCNC: 78 MG/DL (ref ?–150)
VIT B12 SERPL-MCNC: >2000 PG/ML (ref 211–911)
VLDLC SERPL CALC-MCNC: 15.6 MG/DL

## 2022-11-29 ENCOUNTER — OFFICE VISIT (OUTPATIENT)
Dept: FAMILY MEDICINE CLINIC | Age: 62
End: 2022-11-29
Payer: COMMERCIAL

## 2022-11-29 VITALS
HEIGHT: 69 IN | RESPIRATION RATE: 20 BRPM | SYSTOLIC BLOOD PRESSURE: 126 MMHG | OXYGEN SATURATION: 98 % | HEART RATE: 93 BPM | BODY MASS INDEX: 27.37 KG/M2 | WEIGHT: 184.8 LBS | DIASTOLIC BLOOD PRESSURE: 80 MMHG | TEMPERATURE: 97 F

## 2022-11-29 DIAGNOSIS — Z82.49 FAMILY HISTORY OF MI (MYOCARDIAL INFARCTION): ICD-10-CM

## 2022-11-29 DIAGNOSIS — R42 DIZZINESS: ICD-10-CM

## 2022-11-29 DIAGNOSIS — Z13.21 ENCOUNTER FOR VITAMIN DEFICIENCY SCREENING: ICD-10-CM

## 2022-11-29 DIAGNOSIS — E78.2 MIXED HYPERLIPIDEMIA: Primary | ICD-10-CM

## 2022-11-29 DIAGNOSIS — R06.00 DYSPNEA, UNSPECIFIED TYPE: ICD-10-CM

## 2022-11-29 PROCEDURE — 99215 OFFICE O/P EST HI 40 MIN: CPT | Performed by: NURSE PRACTITIONER

## 2022-11-29 RX ORDER — LANOLIN ALCOHOL/MO/W.PET/CERES
CREAM (GRAM) TOPICAL
COMMUNITY

## 2022-11-29 NOTE — PROGRESS NOTES
Yusra Yepez is a 65 y. o.female presents with   Chief Complaint   Patient presents with    Dizziness    Hypertension    Shortness of Breath     52-year-old female presents today in office with complaint of increasing dyspnea on exertion and dizziness. Patient has history significant for mixed hyperlipidemia. She relates she does have strong cardiac family history to include MI as in the family. She denies any current chest pain palpitations. Subjective:           Past Medical History:   Diagnosis Date    Allergic rhinitis 8/4/2020    Ankle pain 8/4/2020    Anxiety 8/4/2020    Asthma     Hyperlipidemia 8/4/2020     Past Surgical History:   Procedure Laterality Date    HX ORTHOPAEDIC      reconstructive jaw    HX TONSILLECTOMY       Social History     Socioeconomic History    Marital status:    Tobacco Use    Smoking status: Never    Smokeless tobacco: Never    Tobacco comments:     raised by smoker and was around smokers in the [employer], exposed to second had smoke until 23years old   Vaping Use    Vaping Use: Never used   Substance and Sexual Activity    Alcohol use: Not Currently    Drug use: Never     Current Outpatient Medications   Medication Sig Dispense Refill    ferrous sulfate (Iron) 325 mg (65 mg iron) tablet Take  by mouth Daily (before breakfast).  atorvastatin (LIPITOR) 20 mg tablet Take 1 tablet by mouth once daily 30 Tablet 2    PARoxetine mesylate,menop.sym, 7.5 mg cap Take 1 Tab by mouth daily.  montelukast (SINGULAIR) 10 mg tablet Take 10 mg by mouth daily.  loratadine (CLARITIN) 10 mg tablet Take 10 mg by mouth.  fluticasone propionate (FLONASE) 50 mcg/actuation nasal spray 1 [place] by Both Nostrils route nightly.       allergy injection Injected 0.5 ml subcutaneously in left arm 0.5 mL 0    allergy injection Injected 0.5 ml right arm subcutaneously 0.5 mL 0     Allergies   Allergen Reactions    Latex Rash    Penicillins Anaphylaxis     The patient has a family history of    REVIEW OF SYSTEMS  Review of Systems   Respiratory:  Positive for shortness of breath. Negative for cough. Cardiovascular:  Negative for chest pain, palpitations and leg swelling. Neurological:  Positive for dizziness. Negative for headaches. Objective:     Visit Vitals  /80 (BP 1 Location: Right upper arm, BP Patient Position: Sitting, BP Cuff Size: Adult)   Pulse 93   Temp 97 °F (36.1 °C) (Temporal)   Resp 20   Ht 5' 9\" (1.753 m)   Wt 184 lb 12.8 oz (83.8 kg)   SpO2 98%   BMI 27.29 kg/m²       Current Outpatient Medications   Medication Instructions    allergy injection Injected 0.5 ml subcutaneously in left arm    allergy injection Injected 0.5 ml right arm subcutaneously    atorvastatin (LIPITOR) 20 mg tablet Take 1 tablet by mouth once daily    ferrous sulfate (Iron) 325 mg (65 mg iron) tablet Oral, DAILY BEFORE BREAKFAST    fluticasone propionate (FLONASE) 50 mcg/actuation nasal spray 1 Spray, Both Nostrils, EVERY BEDTIME    loratadine (CLARITIN) 10 mg, Oral    montelukast (SINGULAIR) 10 mg, Oral, DAILY    PARoxetine mesylate,menop.sym, 7.5 mg cap 1 Tablet, Oral, DAILY        PHYSICAL EXAM  Physical Exam  Constitutional:       Appearance: Normal appearance. Cardiovascular:      Heart sounds: Normal heart sounds. Pulmonary:      Breath sounds: Normal breath sounds. Musculoskeletal:      Right lower leg: No edema. Left lower leg: No edema. Neurological:      Mental Status: She is alert and oriented to person, place, and time. Psychiatric:         Behavior: Behavior normal.       Assessment/Plan:     Diagnoses and all orders for this visit:    1. Mixed hyperlipidemia  -     LIPID PANEL    2. Family history of MI (myocardial infarction)    3.  Dyspnea, unspecified type  -     METABOLIC PANEL, COMPREHENSIVE  -     MAGNESIUM  -     IRON PROFILE  -     FERRITIN  -     ECHO ADULT COMPLETE; Future  -     EXERCISE CARDIAC STRESS TEST; Future  I am working the patient up given her signs and symptoms and her concern in regards to her family's medical history further treatment or evaluation will be contingent upon the findings. If you experience chest pain call 911. Do not drive yourself. 4. Dizziness  -     CBC W/O DIFF  -     HEMOGLOBIN A1C W/O EAG  -     LIPID PANEL  -     METABOLIC PANEL, COMPREHENSIVE  -     MAGNESIUM  -     IRON PROFILE  -     FERRITIN  -     TSH 3RD GENERATION  -     ECHO ADULT COMPLETE; Future  -     EXERCISE CARDIAC STRESS TEST; Future    5. Encounter for vitamin deficiency screening  -     VITAMIN D, 25 HYDROXY  -     VITAMIN B12        Follow-up and Dispositions    Return if symptoms worsen or fail to improve. Disclaimer:    I have discussed the diagnosis with the patient and the intended plan as seen above. The patient understands our medical plan. The risks, benefits and significant side effects of all medications have been reviewed. Anticipated time course and progression of condition reviewed. All questions have been addressed. She received an after visit summary, with information reviewed, and questions answered. Where appropriate, she is instructed to call the clinic if she has not been notified either by phone or through [address] with the results of her tests or with an appointment plan for any referrals within 1 week(s). The patient  is to call if her condition worsens or fails to improve or if significant side effects are experienced.        Bossman Carrillo NP

## 2022-12-29 ENCOUNTER — HOSPITAL ENCOUNTER (OUTPATIENT)
Dept: NON INVASIVE DIAGNOSTICS | Age: 62
End: 2022-12-29
Attending: NURSE PRACTITIONER
Payer: COMMERCIAL

## 2022-12-29 ENCOUNTER — HOSPITAL ENCOUNTER (OUTPATIENT)
Dept: NON INVASIVE DIAGNOSTICS | Age: 62
Discharge: HOME | End: 2022-12-29
Attending: NURSE PRACTITIONER
Payer: COMMERCIAL

## 2022-12-29 VITALS
WEIGHT: 184 LBS | HEIGHT: 69 IN | SYSTOLIC BLOOD PRESSURE: 121 MMHG | BODY MASS INDEX: 27.25 KG/M2 | DIASTOLIC BLOOD PRESSURE: 83 MMHG

## 2022-12-29 DIAGNOSIS — R06.00 DYSPNEA, UNSPECIFIED TYPE: ICD-10-CM

## 2022-12-29 DIAGNOSIS — R42 DIZZINESS: ICD-10-CM

## 2022-12-29 LAB
ECHO AO ASC DIAM: 3.3 CM
ECHO AO ASCENDING AORTA INDEX: 1.66 CM/M2
ECHO AO ROOT DIAM: 3.3 CM
ECHO AO ROOT INDEX: 1.66 CM/M2
ECHO AV AREA PEAK VELOCITY: 3 CM2
ECHO AV AREA VTI: 3 CM2
ECHO AV AREA/BSA PEAK VELOCITY: 1.5 CM2/M2
ECHO AV AREA/BSA VTI: 1.5 CM2/M2
ECHO AV MEAN GRADIENT: 3 MMHG
ECHO AV MEAN VELOCITY: 0.8 M/S
ECHO AV PEAK GRADIENT: 5 MMHG
ECHO AV PEAK VELOCITY: 1.1 M/S
ECHO AV VELOCITY RATIO: 0.82
ECHO AV VTI: 27 CM
ECHO LA DIAMETER INDEX: 1.71 CM/M2
ECHO LA DIAMETER: 3.4 CM
ECHO LA TO AORTIC ROOT RATIO: 1.03
ECHO LA VOL 2C: 48 ML (ref 22–52)
ECHO LA VOL 4C: 49 ML (ref 22–52)
ECHO LA VOL BP: 48 ML (ref 22–52)
ECHO LA VOL/BSA BIPLANE: 24 ML/M2 (ref 16–34)
ECHO LA VOLUME AREA LENGTH: 53 ML
ECHO LA VOLUME INDEX A2C: 24 ML/M2 (ref 16–34)
ECHO LA VOLUME INDEX A4C: 25 ML/M2 (ref 16–34)
ECHO LA VOLUME INDEX AREA LENGTH: 27 ML/M2 (ref 16–34)
ECHO LV E' LATERAL VELOCITY: 9 CM/S
ECHO LV E' SEPTAL VELOCITY: 11 CM/S
ECHO LV EDV A2C: 58 ML
ECHO LV EDV A4C: 107 ML
ECHO LV EDV BP: 85 ML (ref 56–104)
ECHO LV EDV INDEX A4C: 54 ML/M2
ECHO LV EDV INDEX BP: 43 ML/M2
ECHO LV EDV NDEX A2C: 29 ML/M2
ECHO LV EJECTION FRACTION A2C: 74 %
ECHO LV EJECTION FRACTION A4C: 53 %
ECHO LV EJECTION FRACTION BIPLANE: 65 % (ref 55–100)
ECHO LV ESV A2C: 15 ML
ECHO LV ESV A4C: 50 ML
ECHO LV ESV BP: 30 ML (ref 19–49)
ECHO LV ESV INDEX A2C: 8 ML/M2
ECHO LV ESV INDEX A4C: 25 ML/M2
ECHO LV ESV INDEX BP: 15 ML/M2
ECHO LV FRACTIONAL SHORTENING: 44 % (ref 28–44)
ECHO LV INTERNAL DIMENSION DIASTOLE INDEX: 2.41 CM/M2
ECHO LV INTERNAL DIMENSION DIASTOLIC: 4.8 CM (ref 3.9–5.3)
ECHO LV INTERNAL DIMENSION SYSTOLIC INDEX: 1.36 CM/M2
ECHO LV INTERNAL DIMENSION SYSTOLIC: 2.7 CM
ECHO LV IVSD: 1.1 CM (ref 0.6–0.9)
ECHO LV MASS 2D: 181.9 G (ref 67–162)
ECHO LV MASS INDEX 2D: 91.4 G/M2 (ref 43–95)
ECHO LV POSTERIOR WALL DIASTOLIC: 1 CM (ref 0.6–0.9)
ECHO LV RELATIVE WALL THICKNESS RATIO: 0.42
ECHO LVOT AREA: 3.8 CM2
ECHO LVOT AV VTI INDEX: 0.81
ECHO LVOT DIAM: 2.2 CM
ECHO LVOT MEAN GRADIENT: 2 MMHG
ECHO LVOT PEAK GRADIENT: 3 MMHG
ECHO LVOT PEAK VELOCITY: 0.9 M/S
ECHO LVOT STROKE VOLUME INDEX: 41.6 ML/M2
ECHO LVOT SV: 82.8 ML
ECHO LVOT VTI: 21.8 CM
ECHO MAIN PULMONARY ARTERY DIAMETER: 2.2 CM
ECHO MV A VELOCITY: 0.69 M/S
ECHO MV AREA VTI: 3 CM2
ECHO MV E DECELERATION TIME (DT): 192.2 MS
ECHO MV E VELOCITY: 0.73 M/S
ECHO MV E/A RATIO: 1.06
ECHO MV E/E' LATERAL: 8.11
ECHO MV E/E' RATIO (AVERAGED): 7.37
ECHO MV E/E' SEPTAL: 6.64
ECHO MV LVOT VTI INDEX: 1.28
ECHO MV MAX VELOCITY: 0.8 M/S
ECHO MV MEAN GRADIENT: 1 MMHG
ECHO MV MEAN VELOCITY: 0.5 M/S
ECHO MV PEAK GRADIENT: 2 MMHG
ECHO MV VTI: 28 CM
ECHO PV MAX VELOCITY: 0.8 M/S
ECHO PV MEAN GRADIENT: 2 MMHG
ECHO PV MEAN VELOCITY: 0.6 M/S
ECHO PV PEAK GRADIENT: 3 MMHG
ECHO RV TAPSE: 2.3 CM (ref 1.7–?)
STRESS ANGINA INDEX: 0
STRESS BASELINE HR: 61 BPM
STRESS BASELINE ST DEPRESSION: 0 MM
STRESS ESTIMATED WORKLOAD: 7.1 METS
STRESS EXERCISE DUR MIN: 6 MIN
STRESS EXERCISE DUR SEC: 4 SEC
STRESS PEAK DIAS BP: 75 MMHG
STRESS PEAK SYS BP: 173 MMHG
STRESS PERCENT HR ACHIEVED: 102 %
STRESS POST PEAK HR: 162 BPM
STRESS RATE PRESSURE PRODUCT: NORMAL BPM*MMHG
STRESS SR DUKE TREADMILL SCORE: -2
STRESS ST DEPRESSION: 1.7 MM
STRESS TARGET HR: 159 BPM

## 2022-12-29 PROCEDURE — 93306 TTE W/DOPPLER COMPLETE: CPT

## 2022-12-29 PROCEDURE — 93017 CV STRESS TEST TRACING ONLY: CPT

## 2023-01-06 DIAGNOSIS — R94.39 POSITIVE CARDIAC STRESS TEST: ICD-10-CM

## 2023-01-06 DIAGNOSIS — Z82.49 FAMILY HISTORY OF MI (MYOCARDIAL INFARCTION): Primary | ICD-10-CM

## 2023-01-06 NOTE — PROGRESS NOTES
Patient aware. Referral placed and JT will call and schedule and then inform the patient of her appt date and time.

## 2023-01-06 NOTE — PROGRESS NOTES
Pls expedite rapid referral to [facility] r/t positive stress test-once you get that set up, call pt and advise

## 2023-01-23 ENCOUNTER — OFFICE VISIT (OUTPATIENT)
Dept: CARDIOLOGY CLINIC | Age: 63
End: 2023-01-23
Payer: COMMERCIAL

## 2023-01-23 VITALS
DIASTOLIC BLOOD PRESSURE: 72 MMHG | WEIGHT: 190 LBS | HEART RATE: 68 BPM | SYSTOLIC BLOOD PRESSURE: 121 MMHG | HEIGHT: 69 IN | BODY MASS INDEX: 28.14 KG/M2 | OXYGEN SATURATION: 99 %

## 2023-01-23 DIAGNOSIS — R42 DIZZINESS: ICD-10-CM

## 2023-01-23 DIAGNOSIS — R94.39 ABNORMAL CARDIOVASCULAR STRESS TEST: ICD-10-CM

## 2023-01-23 DIAGNOSIS — E78.2 MIXED HYPERLIPIDEMIA: ICD-10-CM

## 2023-01-23 DIAGNOSIS — R06.00 DYSPNEA, UNSPECIFIED TYPE: Primary | ICD-10-CM

## 2023-01-23 NOTE — PROGRESS NOTES
HISTORY OF PRESENT ILLNESS  Cooper Thomson is a 65 y.o. female. 1/23/2023  Patient is seen today for new patient evaluation. She is referred here for abnormal stress test.  Patient had dyspnea after she had donated blood. A week or 2 after that she was significantly short of breath orthopnea at dizzy. Slowly it has improved. She denies any chest pain. She has strong family history of cardiovascular disease in family. She has a history of hyperlipidemia and family  She had a stress test that was abnormal        Review of Systems   Constitutional:  Negative for chills and fever. HENT:  Negative for nosebleeds. Eyes:  Negative for blurred vision and double vision. Respiratory:  Positive for shortness of breath. Negative for cough, hemoptysis, sputum production and wheezing. Cardiovascular:  Negative for chest pain, palpitations, orthopnea, claudication, leg swelling and PND. Gastrointestinal:  Negative for abdominal pain, heartburn, nausea and vomiting. Musculoskeletal:  Negative for myalgias. Skin:  Negative for rash. Neurological:  Negative for dizziness, weakness and headaches. Endo/Heme/Allergies:  Does not bruise/bleed easily. No family history on file. Past Medical History:   Diagnosis Date    Allergic rhinitis 8/4/2020    Ankle pain 8/4/2020    Anxiety 8/4/2020    Asthma     Hyperlipidemia 8/4/2020       Past Surgical History:   Procedure Laterality Date    HX ORTHOPAEDIC      reconstructive jaw    HX TONSILLECTOMY         Social History     Tobacco Use    Smoking status: Never    Smokeless tobacco: Never    Tobacco comments:     raised by smoker and was around smokers in the [employer], exposed to second had smoke until 23years old   Substance Use Topics    Alcohol use: Not Currently       Allergies   Allergen Reactions    Latex Rash    Penicillins Anaphylaxis       Prior to Admission medications    Medication Sig Start Date End Date Taking?  Authorizing Provider   ferrous sulfate 325 mg (65 mg iron) tablet Take  by mouth Daily (before breakfast). Yes Provider, Historical   atorvastatin (LIPITOR) 20 mg tablet Take 1 tablet by mouth once daily 7/21/22  Yes Gregg Ritchie NP   PARoxetine mesylate,menop.sym, 7.5 mg cap Take 1 Tab by mouth daily. Yes Provider, Historical   montelukast (SINGULAIR) 10 mg tablet Take 10 mg by mouth daily. Yes Provider, Historical   loratadine (CLARITIN) 10 mg tablet Take 10 mg by mouth. Yes Provider, Historical   fluticasone propionate (FLONASE) 50 mcg/actuation nasal spray 1 [place] by Both Nostrils route nightly. Yes Provider, Historical   allergy injection Injected 0.5 ml subcutaneously in left arm 1/12/21  Yes Velma Gowers, MD   allergy injection Injected 0.5 ml right arm subcutaneously 1/12/21  Yes Velma Gowers, MD         Visit Vitals  /72 (BP 1 Location: Left upper arm, BP Patient Position: Sitting, BP Cuff Size: Adult)   Pulse 68   Ht 5' 9\" (1.753 m)   Wt 86.2 kg (190 lb)   SpO2 99%   BMI 28.06 kg/m²     Physical Exam  Constitutional:       Appearance: She is well-developed. HENT:      Head: Normocephalic and atraumatic. Eyes:      Conjunctiva/sclera: Conjunctivae normal.   Neck:      Thyroid: No thyromegaly. Vascular: No JVD. Trachea: No tracheal deviation. Cardiovascular:      Rate and Rhythm: Normal rate and regular rhythm. Chest Wall: PMI is not displaced. Pulses: No decreased pulses. Heart sounds: No murmur heard. No gallop. No S3 sounds. Pulmonary:      Effort: No respiratory distress. Breath sounds: No wheezing or rales. Chest:      Chest wall: No tenderness. Abdominal:      Palpations: Abdomen is soft. Tenderness: There is no abdominal tenderness. Musculoskeletal:      Cervical back: Neck supple. Skin:     General: Skin is warm. Neurological:      Mental Status: She is alert and oriented to person, place, and time.      Ms. Jean Carlos Zelaya has a reminder for a \"due or due soon\" health maintenance. I have asked that she contact her primary care provider for follow-up on this health maintenance. No flowsheet data found. I have personally reviewed patient's records available from hospital and other providers and incorporated findings in patient care. Note, echo, stress test, lab  Interpretation Summary 12/2022         Left Ventricle: Normal left ventricular systolic function. EF by 2D Simpsons Biplane is 65%. Left ventricle size is normal. Mildly increased wall thickness. Findings consistent with mild concentric hypertrophy. Normal wall motion. Normal diastolic function. Charan Israel MD Cardiology 12/29/2022     Interpretation Summary         ECG: Resting ECG demonstrates normal sinus rhythm. ECG: Stress ECG was positive for ischemia. Stress Test: A [employer] protocol stress test was performed. Overall, the patient's exercise capacity was above average for their age. The patient reached stage 3 of the protocol And was stressed for 6 min and 4 sec. The patient reported no symptoms during the stress test.     Assessment         ICD-10-CM ICD-9-CM    1. Dyspnea, unspecified type  R06.00 786.09 AMB POC EKG ROUTINE W/ 12 LEADS, INTER & REP      ECHO STRESS      CT HEART W/O CONT WITH CALCIUM    Possibly related to blood donation. Rule out ischemic etiology LV function is normal no valvular disease      2. Abnormal cardiovascular stress test  R94.39 794.39 AMB POC EKG ROUTINE W/ 12 LEADS, INTER & REP      ECHO STRESS      CT HEART W/O CONT WITH CALCIUM    Regular stress test abnormal will do stress echo      3. Mixed hyperlipidemia  E78.2 272.2 ECHO STRESS      CT HEART W/O CONT WITH CALCIUM    Continue statin check coronary calcium score      4. Dizziness  R42 780.4 ECHO STRESS      CT HEART W/O CONT WITH CALCIUM    Improved      1/2023  Seen with dyspnea on exertion-after donating blood.   Strong family history abnormal stress test we will set up for a stress echo and coronary calcium score based on that further plan will be decided. There are no discontinued medications. Orders Placed This Encounter    CT HEART W/O CONT WITH CALCIUM     Standing Status:   Future     Standing Expiration Date:   2/23/2024    AMB POC EKG ROUTINE W/ 12 LEADS, INTER & REP     Order Specific Question:   Reason for Exam:     Answer:   htn    ECHO STRESS     Standing Status:   Future     Standing Expiration Date:   7/26/2023     Order Specific Question:   Contrast Enhancement (Bubble Study, Definity, Optison) may be used if criteria listed in established evidence-based protocol has been identified. Answer:   Yes     Order Specific Question:   Stressing Agent     Answer:   Exercise         Follow-up and Dispositions    Return for Follow-up after test with [facility].

## 2023-02-01 ENCOUNTER — HOSPITAL ENCOUNTER (OUTPATIENT)
Dept: LAB | Age: 63
Discharge: HOME | End: 2023-02-01
Payer: COMMERCIAL

## 2023-02-01 LAB
25(OH)D3 SERPL-MCNC: 58.2 NG/ML (ref 30–100)
ALBUMIN SERPL-MCNC: 3.8 G/DL (ref 3.4–5)
ALBUMIN/GLOB SERPL: 1.1 (ref 0.8–1.7)
ALP SERPL-CCNC: 119 U/L (ref 45–117)
ALT SERPL-CCNC: 39 U/L (ref 13–56)
ANION GAP SERPL CALC-SCNC: 5 MMOL/L (ref 3–18)
AST SERPL W P-5'-P-CCNC: 17 U/L (ref 10–38)
BILIRUB SERPL-MCNC: 1 MG/DL (ref 0.2–1)
BUN SERPL-MCNC: 17 MG/DL (ref 7–18)
BUN/CREAT SERPL: 20 (ref 12–20)
CA-I BLD-MCNC: 9.1 MG/DL (ref 8.5–10.1)
CHLORIDE SERPL-SCNC: 104 MMOL/L (ref 100–111)
CHOLEST SERPL-MCNC: 189 MG/DL
CO2 SERPL-SCNC: 29 MMOL/L (ref 21–32)
CREAT SERPL-MCNC: 0.83 MG/DL (ref 0.6–1.3)
ERYTHROCYTE [DISTWIDTH] IN BLOOD BY AUTOMATED COUNT: 13 % (ref 11.6–14.5)
EST. AVERAGE GLUCOSE BLD GHB EST-MCNC: 120 MG/DL
FERRITIN SERPL-MCNC: 37 NG/ML (ref 8–388)
GLOBULIN SER CALC-MCNC: 3.5 G/DL (ref 2–4)
GLUCOSE SERPL-MCNC: 98 MG/DL (ref 74–99)
HBA1C MFR BLD: 5.8 % (ref 4.2–5.6)
HCT VFR BLD AUTO: 42.8 % (ref 35–45)
HDLC SERPL-MCNC: 77 MG/DL (ref 40–60)
HDLC SERPL: 2.5 (ref 0–5)
HGB BLD-MCNC: 13.6 G/DL (ref 12–16)
IRON SATN MFR SERPL: 22 % (ref 20–50)
IRON SERPL-MCNC: 80 UG/DL (ref 50–175)
LDLC SERPL CALC-MCNC: 88.4 MG/DL (ref 0–100)
LIPID PROFILE,FLP: ABNORMAL
MAGNESIUM SERPL-MCNC: 2.2 MG/DL (ref 1.6–2.6)
MCH RBC QN AUTO: 29.6 PG (ref 24–34)
MCHC RBC AUTO-ENTMCNC: 31.8 G/DL (ref 31–37)
MCV RBC AUTO: 93.2 FL (ref 78–100)
NRBC # BLD: 0 K/UL (ref 0–0.01)
NRBC BLD-RTO: 0 PER 100 WBC
PLATELET # BLD AUTO: 267 K/UL (ref 135–420)
PMV BLD AUTO: 9 FL (ref 9.2–11.8)
POTASSIUM SERPL-SCNC: 4.1 MMOL/L (ref 3.5–5.5)
PROT SERPL-MCNC: 7.3 G/DL (ref 6.4–8.2)
RBC # BLD AUTO: 4.59 M/UL (ref 4.2–5.3)
SODIUM SERPL-SCNC: 138 MMOL/L (ref 136–145)
TIBC SERPL-MCNC: 362 UG/DL (ref 250–450)
TRIGL SERPL-MCNC: 118 MG/DL (ref ?–150)
TSH SERPL DL<=0.05 MIU/L-ACNC: 3.34 UIU/ML (ref 0.36–3.74)
VIT B12 SERPL-MCNC: >2000 PG/ML (ref 211–911)
VLDLC SERPL CALC-MCNC: 23.6 MG/DL
WBC # BLD AUTO: 5.3 K/UL (ref 4.6–13.2)

## 2023-02-01 PROCEDURE — 82728 ASSAY OF FERRITIN: CPT

## 2023-02-01 PROCEDURE — 83036 HEMOGLOBIN GLYCOSYLATED A1C: CPT

## 2023-02-01 PROCEDURE — 82306 VITAMIN D 25 HYDROXY: CPT

## 2023-02-01 PROCEDURE — 84443 ASSAY THYROID STIM HORMONE: CPT

## 2023-02-01 PROCEDURE — 83540 ASSAY OF IRON: CPT

## 2023-02-01 PROCEDURE — 85027 COMPLETE CBC AUTOMATED: CPT

## 2023-02-01 PROCEDURE — 80053 COMPREHEN METABOLIC PANEL: CPT

## 2023-02-01 PROCEDURE — 80061 LIPID PANEL: CPT

## 2023-02-01 PROCEDURE — 36415 COLL VENOUS BLD VENIPUNCTURE: CPT

## 2023-02-01 PROCEDURE — 82607 VITAMIN B-12: CPT

## 2023-02-01 PROCEDURE — 83735 ASSAY OF MAGNESIUM: CPT

## 2023-02-09 ENCOUNTER — OFFICE VISIT (OUTPATIENT)
Dept: CARDIOLOGY CLINIC | Age: 63
End: 2023-02-09
Payer: COMMERCIAL

## 2023-02-09 VITALS
DIASTOLIC BLOOD PRESSURE: 76 MMHG | WEIGHT: 188 LBS | SYSTOLIC BLOOD PRESSURE: 120 MMHG | HEART RATE: 80 BPM | BODY MASS INDEX: 27.85 KG/M2 | OXYGEN SATURATION: 99 % | HEIGHT: 69 IN

## 2023-02-09 DIAGNOSIS — R06.00 DYSPNEA, UNSPECIFIED TYPE: Primary | ICD-10-CM

## 2023-02-09 DIAGNOSIS — E78.2 MIXED HYPERLIPIDEMIA: ICD-10-CM

## 2023-02-09 DIAGNOSIS — R94.39 ABNORMAL CARDIOVASCULAR STRESS TEST: ICD-10-CM

## 2023-02-09 DIAGNOSIS — R42 DIZZINESS: ICD-10-CM

## 2023-02-09 PROCEDURE — 99214 OFFICE O/P EST MOD 30 MIN: CPT | Performed by: NURSE PRACTITIONER

## 2023-02-09 RX ORDER — LANOLIN ALCOHOL/MO/W.PET/CERES
1000 CREAM (GRAM) TOPICAL DAILY
COMMUNITY

## 2023-02-09 RX ORDER — ZINC GLUCONATE 100 MG
100 TABLET ORAL DAILY
COMMUNITY

## 2023-02-09 RX ORDER — LANOLIN ALCOHOL/MO/W.PET/CERES
400 CREAM (GRAM) TOPICAL DAILY
COMMUNITY

## 2023-02-09 RX ORDER — GUAIFENESIN 100 MG/5ML
81 LIQUID (ML) ORAL DAILY
COMMUNITY

## 2023-02-09 RX ORDER — MULTIVITAMIN
1 TABLET ORAL DAILY
COMMUNITY

## 2023-02-09 RX ORDER — ASCORBIC ACID 500 MG
500 TABLET ORAL DAILY
COMMUNITY

## 2023-02-09 NOTE — PROGRESS NOTES
HISTORY OF PRESENT ILLNESS  Dima Guevara is a 65 y.o. female. 1/23/2023  Patient is seen today for new patient evaluation. She is referred here for abnormal stress test.  Patient had dyspnea after she had donated blood. A week or 2 after that she was significantly short of breath orthopnea at dizzy. Slowly it has improved. She denies any chest pain. She has strong family history of cardiovascular disease in family. She has a history of hyperlipidemia and family  She had a stress test that was abnormal  2/2023  Patient seen in follow up for testing. She denies chest pain, shortness of breath, palpitations or edema. Follow-up  The history is provided by the Patient and medical records. Pertinent negatives include no chest pain, no abdominal pain, no headaches and no shortness of breath. Review of Systems   Constitutional:  Negative for chills and fever. HENT:  Negative for nosebleeds. Eyes:  Negative for blurred vision and double vision. Respiratory:  Negative for cough, hemoptysis, sputum production, shortness of breath and wheezing. Cardiovascular:  Negative for chest pain, palpitations, orthopnea, claudication, leg swelling and PND. Gastrointestinal:  Negative for abdominal pain, heartburn, nausea and vomiting. Musculoskeletal:  Negative for myalgias. Skin:  Negative for rash. Neurological:  Negative for dizziness, weakness and headaches. Endo/Heme/Allergies:  Does not bruise/bleed easily. History reviewed. No pertinent family history.     Past Medical History:   Diagnosis Date    Allergic rhinitis 8/4/2020    Ankle pain 8/4/2020    Anxiety 8/4/2020    Asthma     Hyperlipidemia 8/4/2020       Past Surgical History:   Procedure Laterality Date    HX ORTHOPAEDIC      reconstructive jaw    HX TONSILLECTOMY         Social History     Tobacco Use    Smoking status: Never    Smokeless tobacco: Never    Tobacco comments:     raised by smoker and was around smokers in the [employer], exposed to second had smoke until 23years old   Substance Use Topics    Alcohol use: Not Currently       Allergies   Allergen Reactions    Latex Rash    Penicillins Anaphylaxis       Prior to Admission medications    Medication Sig Start Date End Date Taking? Authorizing Provider   multivitamin (ONE A DAY) tablet Take 1 Tablet by mouth daily. Yes Provider, Historical   ascorbic acid, vitamin C, (Vitamin C) 500 mg tablet Take 500 mg by mouth daily. Yes Provider, Historical   TURMERIC PO Take  by mouth. Yes Provider, Historical   vitamin k 100 mcg tablet Take 100 mcg by mouth daily. Yes Provider, Historical   cyanocobalamin (Vitamin B-12) 1,000 mcg tablet Take 1,000 mcg by mouth daily. Yes Provider, Historical   magnesium oxide (MAG-OX) 400 mg tablet Take 400 mg by mouth daily. Yes Provider, Historical   aspirin 81 mg chewable tablet Take 81 mg by mouth daily. Yes Provider, Historical   ferrous sulfate 325 mg (65 mg iron) tablet Take  by mouth Daily (before breakfast). Yes Provider, Historical   atorvastatin (LIPITOR) 20 mg tablet Take 1 tablet by mouth once daily 7/21/22  Yes Reema Mckinley NP   PARoxetine mesylate,menop.sym, 7.5 mg cap Take 1 Tab by mouth daily. Yes Provider, Historical   montelukast (SINGULAIR) 10 mg tablet Take 10 mg by mouth daily. Yes Provider, Historical   loratadine (CLARITIN) 10 mg tablet Take 10 mg by mouth. Yes Provider, Historical   fluticasone propionate (FLONASE) 50 mcg/actuation nasal spray 1 [place] by Both Nostrils route nightly.    Yes Provider, Historical   allergy injection Injected 0.5 ml subcutaneously in left arm 1/12/21  Yes Bonnie Smith MD   allergy injection Injected 0.5 ml right arm subcutaneously 1/12/21  Yes Bonnie Smith MD         Visit Vitals  /76 (BP 1 Location: Left upper arm, BP Patient Position: Sitting, BP Cuff Size: Adult)   Pulse 80   Ht 5' 9\" (1.753 m)   Wt 85.3 kg (188 lb)   SpO2 99%   BMI 27.76 kg/m²     Physical Exam  Vitals and nursing note reviewed. Constitutional:       Appearance: She is well-developed. HENT:      Head: Normocephalic and atraumatic. Eyes:      Conjunctiva/sclera: Conjunctivae normal.   Neck:      Thyroid: No thyromegaly. Vascular: No JVD. Trachea: No tracheal deviation. Cardiovascular:      Rate and Rhythm: Normal rate and regular rhythm. Chest Wall: PMI is not displaced. Pulses: No decreased pulses. Heart sounds: No murmur heard. No gallop. No S3 sounds. Pulmonary:      Effort: No respiratory distress. Breath sounds: No wheezing or rales. Chest:      Chest wall: No tenderness. Abdominal:      Palpations: Abdomen is soft. Tenderness: There is no abdominal tenderness. Musculoskeletal:      Cervical back: Neck supple. Right lower leg: No edema. Left lower leg: No edema. Skin:     General: Skin is warm. Neurological:      Mental Status: She is alert and oriented to person, place, and time. Ms. Alma Garcia has a reminder for a \"due or due soon\" health maintenance. I have asked that she contact her primary care provider for follow-up on this health maintenance. No flowsheet data found. I have personally reviewed patient's records available from hospital and other providers and incorporated findings in patient care. Note, echo, stress test, lab  Interpretation Summary 12/2022         Left Ventricle: Normal left ventricular systolic function. EF by 2D Simpsons Biplane is 65%. Left ventricle size is normal. Mildly increased wall thickness. Findings consistent with mild concentric hypertrophy. Normal wall motion. Normal diastolic function. Larissa Rodriguez MD Cardiology 12/29/2022     Interpretation Summary         ECG: Resting ECG demonstrates normal sinus rhythm. ECG: Stress ECG was positive for ischemia. Stress Test: A [employer] protocol stress test was performed. Overall, the patient's exercise capacity was above average for their age.  The patient reached stage 3 of the protocol And was stressed for 6 min and 4 sec. The patient reported no symptoms during the stress test.    Stress Echo  2/2023   Interpretation Summary         Study Impression: The study is normal.    Post-stress Echo: The post-stress echo showed no new wall motion abnormalities. Left Ventricle: Normal left ventricular systolic function with a visually estimated EF of 55 - 60%. Left ventricle size is normal. Normal wall motion. ECG: Resting ECG demonstrates normal sinus rhythm. ECG: Stress ECG was negative for ischemia. Stress Test: A [employer] protocol stress test was performed. Overall, the patient's exercise capacity was average for their age. The patient was stressed for 8 min and 16 sec. Hemodynamics are adequate for diagnosis. Blood pressure demonstrated a normal response and heart rate demonstrated a normal response to stress. The patient reported no symptoms during the stress test.    Assessment         ICD-10-CM ICD-9-CM    1. Dyspnea, unspecified type  R06.00 786.09     Possibly related to blood donation. Rule out ischemic etiology LV function is normal no valvular disease      2. Abnormal cardiovascular stress test  R94.39 794.39     Stress echo, negative for ischemia      3. Mixed hyperlipidemia  E78.2 272.2     Continue statin check coronary calcium score      4. Dizziness  R42 780.4      Improved          1/2023  Seen with dyspnea on exertion-after donating blood. Strong family history abnormal stress test we will set up for a stress echo and coronary calcium score based on that further plan will be decided. 2/2023  Seen in follow up for dyspnea on exertion and testing results. Stress echo negative for ischemia. She will schedule coronary calcium score to further risk stratify due to strong family history. Patient requests to be called with results. To continue aspirin and statin. There are no discontinued medications.     No orders of the defined types were placed in this encounter. Follow-up and Dispositions    Return in about 6 months (around 8/9/2023) for Follow up with Dr. Sara Fiore.

## 2023-02-09 NOTE — PROGRESS NOTES
1. Have you been to the ER, urgent care clinic since your last visit? Hospitalized since your last visit? No    2. Have you seen or consulted any other health care providers outside of the [address] [place] [address] since your last visit? Include any pap smears or colon screening.  No

## 2023-02-13 DIAGNOSIS — Z82.49 FAMILY HISTORY OF MI (MYOCARDIAL INFARCTION): Primary | ICD-10-CM

## 2023-05-02 ENCOUNTER — TELEPHONE (OUTPATIENT)
Dept: FAMILY MEDICINE CLINIC | Facility: CLINIC | Age: 63
End: 2023-05-02

## 2023-05-02 RX ORDER — MONTELUKAST SODIUM 10 MG/1
10 TABLET ORAL DAILY
Qty: 90 TABLET | Refills: 3 | Status: SHIPPED | OUTPATIENT
Start: 2023-05-02

## 2023-05-02 NOTE — TELEPHONE ENCOUNTER
Pt called and was wondering if Chloemadelin Tay could take over her Montelukast 10 mg one tab by mouth Rx. She said that Dr. Frank Barnes has retired and she can't get through to the new company that took over. Zulema Treadwell has been unsuccessful as well.

## 2023-06-26 RX ORDER — ATORVASTATIN CALCIUM 20 MG/1
TABLET, FILM COATED ORAL
Qty: 90 TABLET | Refills: 1 | Status: SHIPPED | OUTPATIENT
Start: 2023-06-26

## 2023-08-29 ENCOUNTER — TELEPHONE (OUTPATIENT)
Age: 63
End: 2023-08-29

## 2023-08-29 DIAGNOSIS — E78.2 MIXED HYPERLIPIDEMIA: ICD-10-CM

## 2023-08-29 DIAGNOSIS — R94.39 ABNORMAL RESULT OF OTHER CARDIOVASCULAR FUNCTION STUDY: Primary | ICD-10-CM

## 2023-08-29 NOTE — TELEPHONE ENCOUNTER
Patient tried to get calcium score test done at hospital but they didn't see order.  Replace order so Radha Sanchez can schedule test.

## 2023-09-11 ENCOUNTER — OFFICE VISIT (OUTPATIENT)
Dept: FAMILY MEDICINE CLINIC | Facility: CLINIC | Age: 63
End: 2023-09-11
Payer: COMMERCIAL

## 2023-09-11 VITALS
HEART RATE: 79 BPM | BODY MASS INDEX: 28.47 KG/M2 | TEMPERATURE: 97.8 F | RESPIRATION RATE: 18 BRPM | SYSTOLIC BLOOD PRESSURE: 102 MMHG | WEIGHT: 192.2 LBS | OXYGEN SATURATION: 98 % | DIASTOLIC BLOOD PRESSURE: 67 MMHG | HEIGHT: 69 IN

## 2023-09-11 DIAGNOSIS — Z11.4 SCREENING FOR HIV WITHOUT PRESENCE OF RISK FACTORS: Primary | ICD-10-CM

## 2023-09-11 DIAGNOSIS — E78.2 MIXED HYPERLIPIDEMIA: ICD-10-CM

## 2023-09-11 DIAGNOSIS — Z00.00 WELLNESS EXAMINATION: ICD-10-CM

## 2023-09-11 PROCEDURE — 99396 PREV VISIT EST AGE 40-64: CPT | Performed by: NURSE PRACTITIONER

## 2023-09-11 RX ORDER — ACETAMINOPHEN 160 MG
2000 TABLET,DISINTEGRATING ORAL 2 TIMES DAILY
COMMUNITY

## 2023-09-11 RX ORDER — CALCIUM CARBONATE 500 MG/1
1 TABLET, CHEWABLE ORAL DAILY PRN
COMMUNITY

## 2023-09-11 RX ORDER — CALCIUM CARBONATE 300MG(750)
1 TABLET,CHEWABLE ORAL NIGHTLY
COMMUNITY

## 2023-09-11 RX ORDER — UBIDECARENONE 200 MG
1 CAPSULE ORAL DAILY
COMMUNITY

## 2023-09-11 RX ORDER — AZELASTINE 1 MG/ML
1 SPRAY, METERED NASAL NIGHTLY
COMMUNITY

## 2023-09-11 RX ORDER — TRIAMCINOLONE ACETONIDE 55 UG/1
2 SPRAY, METERED NASAL DAILY
COMMUNITY

## 2023-09-11 RX ORDER — MULTIVIT WITH MINERALS/LUTEIN
500 TABLET ORAL DAILY
COMMUNITY

## 2023-09-11 RX ORDER — CYANOCOBALAMIN (VITAMIN B-12) 5000 MCG
0.25 TABLET,DISINTEGRATING ORAL DAILY
COMMUNITY

## 2023-09-11 RX ORDER — MULTIVITAMIN WITH IRON
1 TABLET ORAL DAILY
COMMUNITY

## 2023-09-11 RX ORDER — ASPIRIN 81 MG/1
81 TABLET ORAL DAILY
COMMUNITY

## 2023-09-11 RX ORDER — CHOLECALCIFEROL (VITAMIN D3) 125 MCG
2.5 CAPSULE ORAL NIGHTLY
COMMUNITY

## 2023-09-11 RX ORDER — KRILL/OM-3/DHA/EPA/PHOSPHO/AST 500MG-86MG
500 CAPSULE ORAL DAILY
COMMUNITY

## 2023-09-11 SDOH — ECONOMIC STABILITY: HOUSING INSECURITY
IN THE LAST 12 MONTHS, WAS THERE A TIME WHEN YOU DID NOT HAVE A STEADY PLACE TO SLEEP OR SLEPT IN A SHELTER (INCLUDING NOW)?: NO

## 2023-09-11 SDOH — ECONOMIC STABILITY: INCOME INSECURITY: HOW HARD IS IT FOR YOU TO PAY FOR THE VERY BASICS LIKE FOOD, HOUSING, MEDICAL CARE, AND HEATING?: NOT HARD AT ALL

## 2023-09-11 SDOH — ECONOMIC STABILITY: FOOD INSECURITY: WITHIN THE PAST 12 MONTHS, THE FOOD YOU BOUGHT JUST DIDN'T LAST AND YOU DIDN'T HAVE MONEY TO GET MORE.: NEVER TRUE

## 2023-09-11 SDOH — ECONOMIC STABILITY: FOOD INSECURITY: WITHIN THE PAST 12 MONTHS, YOU WORRIED THAT YOUR FOOD WOULD RUN OUT BEFORE YOU GOT MONEY TO BUY MORE.: NEVER TRUE

## 2023-09-11 ASSESSMENT — ENCOUNTER SYMPTOMS
SHORTNESS OF BREATH: 0
CHEST TIGHTNESS: 0

## 2023-09-11 ASSESSMENT — PATIENT HEALTH QUESTIONNAIRE - PHQ9
2. FEELING DOWN, DEPRESSED OR HOPELESS: 0
SUM OF ALL RESPONSES TO PHQ QUESTIONS 1-9: 0
SUM OF ALL RESPONSES TO PHQ9 QUESTIONS 1 & 2: 0
1. LITTLE INTEREST OR PLEASURE IN DOING THINGS: 0

## 2023-09-29 ENCOUNTER — HOSPITAL ENCOUNTER (OUTPATIENT)
Facility: HOSPITAL | Age: 63
Discharge: HOME | End: 2023-09-29
Attending: INTERNAL MEDICINE

## 2023-09-29 DIAGNOSIS — E78.2 MIXED HYPERLIPIDEMIA: ICD-10-CM

## 2023-09-29 DIAGNOSIS — R94.39 ABNORMAL RESULT OF OTHER CARDIOVASCULAR FUNCTION STUDY: ICD-10-CM

## 2023-09-29 PROCEDURE — 75571 CT HRT W/O DYE W/CA TEST: CPT

## 2023-12-12 RX ORDER — ATORVASTATIN CALCIUM 20 MG/1
TABLET, FILM COATED ORAL
Qty: 90 TABLET | Refills: 1 | Status: SHIPPED | OUTPATIENT
Start: 2023-12-12

## 2024-01-11 ENCOUNTER — OFFICE VISIT (OUTPATIENT)
Age: 64
End: 2024-01-11
Payer: COMMERCIAL

## 2024-01-11 VITALS
DIASTOLIC BLOOD PRESSURE: 77 MMHG | SYSTOLIC BLOOD PRESSURE: 124 MMHG | OXYGEN SATURATION: 97 % | BODY MASS INDEX: 29.18 KG/M2 | HEART RATE: 85 BPM | HEIGHT: 69 IN | WEIGHT: 197 LBS

## 2024-01-11 DIAGNOSIS — I25.84 CORONARY ARTERY CALCIFICATION: Primary | ICD-10-CM

## 2024-01-11 DIAGNOSIS — R94.39 ABNORMAL STRESS TEST: ICD-10-CM

## 2024-01-11 DIAGNOSIS — E78.2 MIXED HYPERLIPIDEMIA: ICD-10-CM

## 2024-01-11 DIAGNOSIS — R06.00 DYSPNEA, UNSPECIFIED TYPE: ICD-10-CM

## 2024-01-11 DIAGNOSIS — I25.10 CORONARY ARTERY CALCIFICATION: Primary | ICD-10-CM

## 2024-01-11 PROCEDURE — 99214 OFFICE O/P EST MOD 30 MIN: CPT | Performed by: INTERNAL MEDICINE

## 2024-01-11 ASSESSMENT — PATIENT HEALTH QUESTIONNAIRE - PHQ9
SUM OF ALL RESPONSES TO PHQ9 QUESTIONS 1 & 2: 0
SUM OF ALL RESPONSES TO PHQ QUESTIONS 1-9: 0
SUM OF ALL RESPONSES TO PHQ QUESTIONS 1-9: 0
DEPRESSION UNABLE TO ASSESS: FUNCTIONAL CAPACITY MOTIVATION LIMITS ACCURACY
SUM OF ALL RESPONSES TO PHQ QUESTIONS 1-9: 0
1. LITTLE INTEREST OR PLEASURE IN DOING THINGS: 0
2. FEELING DOWN, DEPRESSED OR HOPELESS: 0
SUM OF ALL RESPONSES TO PHQ QUESTIONS 1-9: 0

## 2024-01-11 NOTE — PROGRESS NOTES
1. Have you been to the ER, urgent care clinic since your last visit?  Hospitalized since your last visit?     No    2. Have you seen or consulted any other health care providers outside of the [facility] since your last visit?  Include any pap smears or colon screening.      No\      
coronary  calcium present . Coronary calcium score calculated at  3,LAD-3.     IMPRESSION:  Coronary calcium score 3.           No data to display                  Assessment        Diagnosis Orders   1. Coronary artery calcification      Minimal coronary calcification with score of 3 continue medical management patient on statin      2. Mixed hyperlipidemia      Continue treatment with PCP      3. Dyspnea, unspecified type      Stable monitor      4. Abnormal stress test      Continue medical management stable          Medications Discontinued During This Encounter   Medication Reason    fluticasone (FLONASE) 50 MCG/ACT nasal spray DISCONTINUED BY ANOTHER CLINICIAN    loratadine (CLARITIN) 10 MG tablet DISCONTINUED BY ANOTHER CLINICIAN    triamcinolone (NASACORT ALLERGY 24HR) 55 MCG/ACT nasal inhaler DISCONTINUED BY ANOTHER CLINICIAN    montelukast (SINGULAIR) 10 MG tablet DISCONTINUED BY ANOTHER CLINICIAN       No orders of the defined types were placed in this encounter.      Follow-up and Dispositions    Return in about 1 year (around 1/11/2025).           1/2023  Seen with dyspnea on exertion-after donating blood.  Strong family history abnormal stress test we will set up for a stress echo and coronary calcium score based on that further plan will be decided.  2/2023  Seen in follow up for dyspnea on exertion and testing results.  Stress echo negative for ischemia.  She will schedule coronary calcium score to further risk stratify due to strong family history.  Patient requests to be called with results. To continue aspirin and statin.    1/2024  Stable cardiac status.  Coronary calcium score of 3.  With minimal calcification will continue statin.  Symptoms stable

## 2024-01-18 ENCOUNTER — TELEPHONE (OUTPATIENT)
Dept: FAMILY MEDICINE CLINIC | Facility: CLINIC | Age: 64
End: 2024-01-18

## 2024-01-18 DIAGNOSIS — Z12.11 ENCOUNTER FOR SCREENING FOR MALIGNANT NEOPLASM OF COLON: Primary | ICD-10-CM

## 2024-01-18 NOTE — TELEPHONE ENCOUNTER
Pt wanted to get a cologuard order since she's supposed to do it every 3 years.   patient left voicemail stating his leg is red and has a lump

## 2024-02-15 LAB — NONINV COLON CA DNA+OCC BLD SCRN STL QL: NEGATIVE

## 2024-03-01 DIAGNOSIS — F41.9 ANXIETY: Primary | ICD-10-CM

## 2024-03-01 RX ORDER — PAROXETINE 7.5 MG/1
1 CAPSULE ORAL DAILY
Qty: 90 CAPSULE | Refills: 1 | Status: SHIPPED | OUTPATIENT
Start: 2024-03-01

## 2024-03-01 NOTE — TELEPHONE ENCOUNTER
Patient called in requesting a refill on her paroxetine 7.5mg capsules. Would like a 90 day supply submitted to [facility] in [place]. Bfowler,lpn

## 2024-06-17 RX ORDER — ATORVASTATIN CALCIUM 20 MG/1
TABLET, FILM COATED ORAL
Qty: 90 TABLET | Refills: 0 | Status: SHIPPED | OUTPATIENT
Start: 2024-06-17

## 2024-09-12 RX ORDER — ATORVASTATIN CALCIUM 20 MG/1
TABLET, FILM COATED ORAL
Qty: 90 TABLET | Refills: 0 | Status: SHIPPED | OUTPATIENT
Start: 2024-09-12

## 2024-09-23 ENCOUNTER — OFFICE VISIT (OUTPATIENT)
Facility: CLINIC | Age: 64
End: 2024-09-23
Payer: COMMERCIAL

## 2024-09-23 VITALS
HEIGHT: 69 IN | RESPIRATION RATE: 18 BRPM | WEIGHT: 190.8 LBS | BODY MASS INDEX: 28.26 KG/M2 | TEMPERATURE: 97 F | HEART RATE: 80 BPM | SYSTOLIC BLOOD PRESSURE: 130 MMHG | OXYGEN SATURATION: 98 % | DIASTOLIC BLOOD PRESSURE: 73 MMHG

## 2024-09-23 DIAGNOSIS — Z00.00 WELLNESS EXAMINATION: ICD-10-CM

## 2024-09-23 DIAGNOSIS — E53.8 B12 DEFICIENCY: ICD-10-CM

## 2024-09-23 DIAGNOSIS — E83.42 HYPOMAGNESEMIA: ICD-10-CM

## 2024-09-23 DIAGNOSIS — Z12.31 ENCOUNTER FOR SCREENING MAMMOGRAM FOR MALIGNANT NEOPLASM OF BREAST: ICD-10-CM

## 2024-09-23 DIAGNOSIS — E55.9 VITAMIN D DEFICIENCY: ICD-10-CM

## 2024-09-23 DIAGNOSIS — F41.9 ANXIETY: Primary | ICD-10-CM

## 2024-09-23 DIAGNOSIS — E78.2 MIXED HYPERLIPIDEMIA: ICD-10-CM

## 2024-09-23 DIAGNOSIS — E61.1 IRON DEFICIENCY: ICD-10-CM

## 2024-09-23 PROCEDURE — 99396 PREV VISIT EST AGE 40-64: CPT | Performed by: NURSE PRACTITIONER

## 2024-09-23 SDOH — ECONOMIC STABILITY: FOOD INSECURITY: WITHIN THE PAST 12 MONTHS, YOU WORRIED THAT YOUR FOOD WOULD RUN OUT BEFORE YOU GOT MONEY TO BUY MORE.: NEVER TRUE

## 2024-09-23 SDOH — ECONOMIC STABILITY: FOOD INSECURITY: WITHIN THE PAST 12 MONTHS, THE FOOD YOU BOUGHT JUST DIDN'T LAST AND YOU DIDN'T HAVE MONEY TO GET MORE.: NEVER TRUE

## 2024-09-23 SDOH — ECONOMIC STABILITY: INCOME INSECURITY: HOW HARD IS IT FOR YOU TO PAY FOR THE VERY BASICS LIKE FOOD, HOUSING, MEDICAL CARE, AND HEATING?: NOT HARD AT ALL

## 2024-09-23 ASSESSMENT — PATIENT HEALTH QUESTIONNAIRE - PHQ9
SUM OF ALL RESPONSES TO PHQ QUESTIONS 1-9: 0
2. FEELING DOWN, DEPRESSED OR HOPELESS: NOT AT ALL
SUM OF ALL RESPONSES TO PHQ QUESTIONS 1-9: 0
1. LITTLE INTEREST OR PLEASURE IN DOING THINGS: NOT AT ALL
SUM OF ALL RESPONSES TO PHQ9 QUESTIONS 1 & 2: 0

## 2024-09-23 ASSESSMENT — ENCOUNTER SYMPTOMS
CHEST TIGHTNESS: 0
SHORTNESS OF BREATH: 0

## 2024-10-10 ENCOUNTER — HOSPITAL ENCOUNTER (OUTPATIENT)
Age: 64
Discharge: HOME | End: 2024-10-13
Payer: COMMERCIAL

## 2024-10-10 VITALS — WEIGHT: 190 LBS | HEIGHT: 69 IN | BODY MASS INDEX: 28.14 KG/M2

## 2024-10-10 DIAGNOSIS — Z12.31 ENCOUNTER FOR SCREENING MAMMOGRAM FOR MALIGNANT NEOPLASM OF BREAST: ICD-10-CM

## 2024-10-10 PROCEDURE — 77063 BREAST TOMOSYNTHESIS BI: CPT

## 2024-12-16 ENCOUNTER — TELEPHONE (OUTPATIENT)
Dept: FAMILY MEDICINE CLINIC | Facility: CLINIC | Age: 64
End: 2024-12-16

## 2024-12-16 RX ORDER — ATORVASTATIN CALCIUM 20 MG/1
TABLET, FILM COATED ORAL
Qty: 90 TABLET | Refills: 0 | Status: SHIPPED | OUTPATIENT
Start: 2024-12-16

## 2024-12-16 RX ORDER — MONTELUKAST SODIUM 10 MG/1
10 TABLET ORAL NIGHTLY
Qty: 90 TABLET | Refills: 1 | Status: SHIPPED | OUTPATIENT
Start: 2024-12-16

## 2025-01-22 ENCOUNTER — OFFICE VISIT (OUTPATIENT)
Age: 65
End: 2025-01-22
Payer: COMMERCIAL

## 2025-01-22 VITALS
HEIGHT: 69 IN | WEIGHT: 198 LBS | BODY MASS INDEX: 29.33 KG/M2 | DIASTOLIC BLOOD PRESSURE: 82 MMHG | OXYGEN SATURATION: 100 % | SYSTOLIC BLOOD PRESSURE: 126 MMHG | HEART RATE: 71 BPM

## 2025-01-22 DIAGNOSIS — R06.00 DYSPNEA, UNSPECIFIED TYPE: ICD-10-CM

## 2025-01-22 DIAGNOSIS — E78.2 MIXED HYPERLIPIDEMIA: ICD-10-CM

## 2025-01-22 DIAGNOSIS — I25.10 CORONARY ARTERY CALCIFICATION: Primary | ICD-10-CM

## 2025-01-22 DIAGNOSIS — R94.39 ABNORMAL STRESS TEST: ICD-10-CM

## 2025-01-22 PROCEDURE — 99213 OFFICE O/P EST LOW 20 MIN: CPT | Performed by: NURSE PRACTITIONER

## 2025-01-22 NOTE — PROGRESS NOTES
1. Have you been to the ER, urgent care clinic since your last visit?  Hospitalized since your last visit?     no    2. Have you seen or consulted any other health care providers outside of the [facility] since your last visit?  Include any pap smears or colon screening.      Yes pcp   
demonstrated a normal response and heart rate demonstrated a normal response to stress. The patient reported no symptoms during the stress test.  CARDIOLOGY REPORT: CT coronary calcium 9/2023     The patient underwent a limited noncontrast CT scan of the chest with cardiac  gating to evaluate for coronary arterial calcification. Using the Agatston  method the coronary calcium score was calculated. There is minimal coronary  calcium present . Coronary calcium score calculated at  3,LAD-3.     IMPRESSION:  Coronary calcium score 3.           No data to display                Assessment      Diagnosis Orders   1. Coronary artery calcification        2. Abnormal stress test        3. Mixed hyperlipidemia        4. Dyspnea, unspecified type            There are no discontinued medications.    No orders of the defined types were placed in this encounter.      1/2023  Seen with dyspnea on exertion-after donating blood.  Strong family history abnormal stress test we will set up for a stress echo and coronary calcium score based on that further plan will be decided.  2/2023  Seen in follow up for dyspnea on exertion and testing results.  Stress echo negative for ischemia.  She will schedule coronary calcium score to further risk stratify due to strong family history.  Patient requests to be called with results. To continue aspirin and statin.    1/2024  Stable cardiac status.  Coronary calcium score of 3.  With minimal calcification will continue statin.  Symptoms stable  1/22/2025  Cardiac status is stable.  H/O Coronary calcium score 3  Will continue statin.  B/P is controlled - continue current medications.  Due for routine labs with PCP. - request to have drawn.

## 2025-03-10 RX ORDER — ATORVASTATIN CALCIUM 20 MG/1
20 TABLET, FILM COATED ORAL DAILY
Qty: 90 TABLET | Refills: 0 | Status: SHIPPED | OUTPATIENT
Start: 2025-03-10

## 2025-03-11 DIAGNOSIS — F41.9 ANXIETY: ICD-10-CM

## 2025-03-11 RX ORDER — PAROXETINE 7.5 MG/1
1 CAPSULE ORAL DAILY
Qty: 90 CAPSULE | Refills: 1 | Status: SHIPPED | OUTPATIENT
Start: 2025-03-11

## 2025-03-11 RX ORDER — MONTELUKAST SODIUM 10 MG/1
10 TABLET ORAL NIGHTLY
Qty: 90 TABLET | Refills: 1 | Status: SHIPPED | OUTPATIENT
Start: 2025-03-11

## 2025-05-30 RX ORDER — ATORVASTATIN CALCIUM 20 MG/1
20 TABLET, FILM COATED ORAL DAILY
Qty: 90 TABLET | Refills: 0 | Status: SHIPPED | OUTPATIENT
Start: 2025-05-30

## 2025-09-04 RX ORDER — ATORVASTATIN CALCIUM 20 MG/1
20 TABLET, FILM COATED ORAL DAILY
Qty: 90 TABLET | Refills: 0 | Status: SHIPPED | OUTPATIENT
Start: 2025-09-04